# Patient Record
Sex: FEMALE | Race: WHITE | Employment: UNEMPLOYED | URBAN - METROPOLITAN AREA
[De-identification: names, ages, dates, MRNs, and addresses within clinical notes are randomized per-mention and may not be internally consistent; named-entity substitution may affect disease eponyms.]

---

## 2022-10-23 ENCOUNTER — APPOINTMENT (OUTPATIENT)
Dept: CT IMAGING | Age: 32
End: 2022-10-23
Attending: EMERGENCY MEDICINE
Payer: MEDICAID

## 2022-10-23 ENCOUNTER — HOSPITAL ENCOUNTER (EMERGENCY)
Age: 32
Discharge: HOME | End: 2022-10-24
Attending: EMERGENCY MEDICINE
Payer: MEDICAID

## 2022-10-23 DIAGNOSIS — N83.201 RIGHT OVARIAN CYST: Primary | ICD-10-CM

## 2022-10-23 DIAGNOSIS — R91.8 PULMONARY NODULES: ICD-10-CM

## 2022-10-23 DIAGNOSIS — R16.1 SPLENOMEGALY: ICD-10-CM

## 2022-10-23 DIAGNOSIS — R14.0 ABDOMINAL DISTENSION: ICD-10-CM

## 2022-10-23 LAB
ALBUMIN SERPL-MCNC: 4.2 G/DL (ref 3.5–5.2)
ALBUMIN/GLOB SERPL: 1.4 {RATIO} (ref 1.1–2.2)
ALP SERPL-CCNC: 34 U/L (ref 35–104)
ALT SERPL-CCNC: 30 U/L (ref 10–35)
ANION GAP SERPL CALC-SCNC: 13 MMOL/L (ref 5–15)
APPEARANCE UR: ABNORMAL
AST SERPL-CCNC: 37 U/L (ref 10–35)
BACTERIA URNS QL MICRO: ABNORMAL /HPF
BASOPHILS # BLD: 0 K/UL (ref 0–0.1)
BASOPHILS NFR BLD: 0 % (ref 0–1)
BILIRUB SERPL-MCNC: 0.4 MG/DL (ref 0.2–1)
BILIRUB UR QL: NEGATIVE
BUN SERPL-MCNC: 14 MG/DL (ref 6–20)
BUN/CREAT SERPL: 21 (ref 12–20)
CALCIUM SERPL-MCNC: 9 MG/DL (ref 8.6–10)
CHLORIDE SERPL-SCNC: 102 MMOL/L (ref 98–107)
CO2 SERPL-SCNC: 23 MMOL/L (ref 22–29)
COLOR UR: ABNORMAL
CREAT SERPL-MCNC: 0.67 MG/DL (ref 0.5–0.9)
DIFFERENTIAL METHOD BLD: ABNORMAL
EOSINOPHIL # BLD: 0.1 K/UL (ref 0–0.4)
EOSINOPHIL NFR BLD: 1 % (ref 0–7)
EPITH CASTS URNS QL MICRO: ABNORMAL /LPF
ERYTHROCYTE [DISTWIDTH] IN BLOOD BY AUTOMATED COUNT: 13.8 % (ref 11.5–14.5)
GLOBULIN SER CALC-MCNC: 2.9 G/DL (ref 2–4)
GLUCOSE SERPL-MCNC: 100 MG/DL (ref 65–100)
GLUCOSE UR STRIP.AUTO-MCNC: NEGATIVE MG/DL
HCG UR QL: NEGATIVE
HCT VFR BLD AUTO: 36.6 % (ref 35–47)
HGB BLD-MCNC: 12.1 G/DL (ref 11.5–16)
HGB UR QL STRIP: ABNORMAL
IMM GRANULOCYTES # BLD AUTO: 0 K/UL (ref 0–0.04)
IMM GRANULOCYTES NFR BLD AUTO: 0 % (ref 0–0.5)
KETONES UR QL STRIP.AUTO: NEGATIVE MG/DL
LEUKOCYTE ESTERASE UR QL STRIP.AUTO: NEGATIVE
LYMPHOCYTES # BLD: 1.2 K/UL (ref 0.8–3.5)
LYMPHOCYTES NFR BLD: 15 % (ref 12–49)
MCH RBC QN AUTO: 29.5 PG (ref 26–34)
MCHC RBC AUTO-ENTMCNC: 33.1 G/DL (ref 30–36.5)
MCV RBC AUTO: 89.3 FL (ref 80–99)
MONOCYTES # BLD: 1.2 K/UL (ref 0–1)
MONOCYTES NFR BLD: 13 % (ref 5–13)
NEUTS SEG # BLD: 6 K/UL (ref 1.8–8)
NEUTS SEG NFR BLD: 70 % (ref 32–75)
NITRITE UR QL STRIP.AUTO: NEGATIVE
NRBC # BLD: 0 K/UL (ref 0–0.01)
NRBC BLD-RTO: 0 PER 100 WBC
PH UR STRIP: 6 [PH] (ref 5–8)
PLATELET # BLD AUTO: 253 K/UL (ref 150–400)
PMV BLD AUTO: 10.2 FL (ref 8.9–12.9)
POTASSIUM SERPL-SCNC: 4 MMOL/L (ref 3.5–5.1)
PROT SERPL-MCNC: 7.1 G/DL (ref 6.4–8.3)
PROT UR STRIP-MCNC: NEGATIVE MG/DL
RBC # BLD AUTO: 4.1 M/UL (ref 3.8–5.2)
RBC #/AREA URNS HPF: ABNORMAL /HPF
SODIUM SERPL-SCNC: 138 MMOL/L (ref 136–145)
SP GR UR REFRACTOMETRY: 1.03 (ref 1–1.03)
UR CULT HOLD, URHOLD: NORMAL
UROBILINOGEN UR QL STRIP.AUTO: 0.2 EU/DL (ref 0.2–1)
WBC # BLD AUTO: 8.6 K/UL (ref 3.6–11)
WBC URNS QL MICRO: ABNORMAL /HPF (ref 0–4)

## 2022-10-23 PROCEDURE — 99285 EMERGENCY DEPT VISIT HI MDM: CPT

## 2022-10-23 PROCEDURE — 81001 URINALYSIS AUTO W/SCOPE: CPT

## 2022-10-23 PROCEDURE — 80053 COMPREHEN METABOLIC PANEL: CPT

## 2022-10-23 PROCEDURE — 96374 THER/PROPH/DIAG INJ IV PUSH: CPT

## 2022-10-23 PROCEDURE — 74011000636 HC RX REV CODE- 636: Performed by: EMERGENCY MEDICINE

## 2022-10-23 PROCEDURE — 74177 CT ABD & PELVIS W/CONTRAST: CPT

## 2022-10-23 PROCEDURE — 36415 COLL VENOUS BLD VENIPUNCTURE: CPT

## 2022-10-23 PROCEDURE — 85025 COMPLETE CBC W/AUTO DIFF WBC: CPT

## 2022-10-23 PROCEDURE — 74011250636 HC RX REV CODE- 250/636: Performed by: EMERGENCY MEDICINE

## 2022-10-23 RX ORDER — KETOROLAC TROMETHAMINE 30 MG/ML
15 INJECTION, SOLUTION INTRAMUSCULAR; INTRAVENOUS
Status: COMPLETED | OUTPATIENT
Start: 2022-10-23 | End: 2022-10-23

## 2022-10-23 RX ADMIN — SODIUM CHLORIDE 1000 ML: 9 INJECTION, SOLUTION INTRAVENOUS at 21:28

## 2022-10-23 RX ADMIN — IOPAMIDOL 100 ML: 755 INJECTION, SOLUTION INTRAVENOUS at 22:18

## 2022-10-23 RX ADMIN — KETOROLAC TROMETHAMINE 15 MG: 30 INJECTION, SOLUTION INTRAMUSCULAR at 21:28

## 2022-10-23 NOTE — Clinical Note
[address]  [facility] EMERGENCY DEPT  [address] [id number]  [phone]    Work/School Note    Date: 10/23/2022    To Whom It May concern:    Evan Farmer was seen and treated today in the emergency room by the following provider(s):  Attending Provider: Sammy Dejesus MD.      Evan Farmer is excused from work/school on 10/24/22 and 10/25/22. She is medically clear to return to work/school on 10/26/2022.        Sincerely,          Simba David MD

## 2022-10-24 ENCOUNTER — APPOINTMENT (OUTPATIENT)
Dept: ULTRASOUND IMAGING | Age: 32
End: 2022-10-24
Attending: EMERGENCY MEDICINE
Payer: MEDICAID

## 2022-10-24 VITALS
DIASTOLIC BLOOD PRESSURE: 87 MMHG | OXYGEN SATURATION: 99 % | BODY MASS INDEX: 24.99 KG/M2 | RESPIRATION RATE: 16 BRPM | HEIGHT: 65 IN | WEIGHT: 150 LBS | HEART RATE: 95 BPM | SYSTOLIC BLOOD PRESSURE: 99 MMHG | TEMPERATURE: 98.7 F

## 2022-10-24 PROCEDURE — 76830 TRANSVAGINAL US NON-OB: CPT

## 2022-10-24 PROCEDURE — 76856 US EXAM PELVIC COMPLETE: CPT

## 2022-10-24 RX ORDER — KETOROLAC TROMETHAMINE 10 MG/1
10 TABLET, FILM COATED ORAL
Qty: 20 TABLET | Refills: 0 | Status: SHIPPED | OUTPATIENT
Start: 2022-10-24 | End: 2022-10-29

## 2022-10-24 NOTE — ED NOTES
Emergency Department Nursing Plan of Care       The Nursing Plan of Care is developed from the Nursing assessment and Emergency Department Attending provider initial evaluation. The plan of care may be reviewed in the ED Provider note.     The Plan of Care was developed with the following considerations:   Patient / Family readiness to learn indicated by:verbalized understanding  Persons(s) to be included in education: patient  Barriers to Learning/Limitations:No    Signed     Joana Alejandra RN    10/23/2022   8:55 PM

## 2022-10-24 NOTE — ED PROVIDER NOTES
3year-old female presents from home accompanied by her boyfriend with a complaint of abdominal pain and distention. Symptoms started a few days ago with some generalized abdominal pain and discomfort. Over the past couple days his symptoms have worsened and she is developed bloating and distention of her lower abdomen. She denies any vomiting or fever. No change in her bowel movements. She has had some urinary symptoms off and on for the past couple of months but is not currently being treated for UTI or having dysuria. She has had a previous  but no other abdominal surgery. No other complaints at this time. Past Medical History:   Diagnosis Date    Rheumatoid arteritis ([facility] [address]        Past Surgical History:   Procedure Laterality Date    HX  SECTION      HX OTHER SURGICAL      LEAP         History reviewed. No pertinent family history. Social History     Socioeconomic History    Marital status:      Spouse name: Not on file    Number of children: Not on file    Years of education: Not on file    Highest education level: Not on file   Occupational History    Not on file   Tobacco Use    Smoking status: Never    Smokeless tobacco: Never   Substance and Sexual Activity    Alcohol use: Yes     Comment: socially/weekends    Drug use: Not Currently    Sexual activity: Not on file   Other Topics Concern    Not on file   Social History Narrative    Not on file     Social Determinants of Health     Financial Resource Strain: Not on file   Food Insecurity: Not on file   Transportation Needs: Not on file   Physical Activity: Not on file   Stress: Not on file   Social Connections: Not on file   Intimate Partner Violence: Not on file   Housing Stability: Not on file         ALLERGIES: Patient has no known allergies. Review of Systems   Constitutional:  Negative for fever. HENT:  Negative for facial swelling. Eyes:  Negative for visual disturbance.    Respiratory:  Negative for chest tightness. Cardiovascular:  Negative for chest pain. Gastrointestinal:  Negative for abdominal pain. Genitourinary:  Negative for difficulty urinating and dysuria. Musculoskeletal:  Negative for arthralgias. Skin:  Negative for rash. Neurological:  Negative for headaches. Hematological:  Negative for adenopathy. Psychiatric/Behavioral:  Negative for suicidal ideas. Vitals:    10/23/22 2312 10/23/22 2330 10/24/22 0000 10/24/22 0112   BP: 114/76 112/74 105/65 99/87   Pulse:       Resp:       Temp:       SpO2: 99% 99% 98% 99%   Weight:       Height:                Physical Exam  Vitals and nursing note reviewed. Constitutional:       General: She is not in acute distress. Appearance: She is well-developed. HENT:      Head: Normocephalic and atraumatic. Eyes:      General: No scleral icterus. Conjunctiva/sclera: Conjunctivae normal.      Pupils: Pupils are equal, round, and reactive to light. Cardiovascular:      Rate and Rhythm: Normal rate. Heart sounds: No murmur heard. Pulmonary:      Effort: Pulmonary effort is normal. No respiratory distress. Abdominal:      General: Bowel sounds are normal. There is distension. Palpations: Abdomen is soft. Tenderness: There is abdominal tenderness in the right lower quadrant and left lower quadrant. Musculoskeletal:         General: Normal range of motion. Cervical back: Normal range of motion and neck supple. Skin:     General: Skin is warm and dry. Findings: No rash. Neurological:      Mental Status: She is alert and oriented to person, place, and time. MDM  Number of Diagnoses or Management Options  Abdominal distension  Pulmonary nodules  Right ovarian cyst  Splenomegaly  Diagnosis management comments: Assessment: Patient here with abdominal pain and distention. Thinks is most likely secondary to a large ovarian cyst that was seen on CT scan.   Ultrasound was subsequently returned obtained to make sure that there was no torsion. This demonstrated normal blood flow to both ovaries and a 10 cm likely hemorrhagic ovarian cyst.  Patient is going to follow-up with her gynecologist for further treatment of the cyst.  She got a call today for an appointment. There was also mention of splenomegaly and pulmonary nodules on the CAT scan. I gave patient follow-up for hematology for further evaluation of these findings. She can return to the ER with any new or worsening symptoms.        Amount and/or Complexity of Data Reviewed  Clinical lab tests: reviewed  Tests in the radiology section of CPT®: reviewed           Procedures

## 2022-10-24 NOTE — ED TRIAGE NOTES
Pt.states she is having lower abdominal pain radiating around to back bilateral, fatigue and bloating. Pt.statse pain started few days ago and bloating today. Abdomen appears distended at triage. Pt.states she even took preg test at home which was neg. Pt.states she has had little to eat today but denies n/v with eating.  LBM yesterday and normal

## 2022-10-28 ENCOUNTER — OFFICE VISIT (OUTPATIENT)
Dept: OBGYN CLINIC | Age: 32
End: 2022-10-28
Payer: MEDICAID

## 2022-10-28 VITALS
DIASTOLIC BLOOD PRESSURE: 72 MMHG | SYSTOLIC BLOOD PRESSURE: 114 MMHG | WEIGHT: 156 LBS | HEIGHT: 65 IN | RESPIRATION RATE: 16 BRPM | HEART RATE: 90 BPM | OXYGEN SATURATION: 98 % | BODY MASS INDEX: 25.99 KG/M2

## 2022-10-28 DIAGNOSIS — N83.201 CYST OF RIGHT OVARY: Primary | ICD-10-CM

## 2022-10-28 PROCEDURE — 99202 OFFICE O/P NEW SF 15 MIN: CPT | Performed by: OBSTETRICS & GYNECOLOGY

## 2022-10-28 NOTE — PROGRESS NOTES
Latha Woody is a 29 y.o. female who presents today for the following:  Chief Complaint   Patient presents with    New Patient     Patient following up from Er for a R ovarian cyst.          HPI  81-year-old G2,  female who presented to the ER with right lower quadrant pain was found to have a right ovarian cyst of 10 cm in diameter. Patient states at that time several other things were found including an enlarged spleen, lung nodules and nodules in her spine spleen. She reports that the symptoms she had at the time of presentation have improved somewhat. She is currently awaiting follow-up with a hematologist for further evaluation. OB History          2    Para   1    Term   1            AB   1    Living   1         SAB   1    IAB        Ectopic        Molar        Multiple        Live Births   1                   /72 (BP 1 Location: Left upper arm, BP Patient Position: Sitting)   Pulse 90   Resp 16   Ht 5' 5\" (1.651 m)   Wt 156 lb (70.8 kg)   LMP 10/09/2022 (Approximate) Comment: Patient states she is on her cycle again today. SpO2 98%   BMI 25.96 kg/m²    OBGyn Exam   Patient is a well-developed well-nourished female no apparent distress  She is alert and oriented x3  Ultrasound reveals a 10 cm right ovarian cyst which is somewhat complex in nature consistent with a hemorrhagic cyst.  No results found for this visit on 10/28/22. Assessment:  Right 10 cm hemorrhagic cyst.  Discussed options for treatment including laparoscopy with right ovarian cystectomy however given other findings at the time of this ER visit we recommend delaying scheduling until after patient has been evaluated by hematology. Patient to be in touch with us and let us know following hematology evaluation. No orders of the defined types were placed in this encounter.

## 2022-11-10 ENCOUNTER — OFFICE VISIT (OUTPATIENT)
Dept: ONCOLOGY | Age: 32
End: 2022-11-10
Payer: MEDICAID

## 2022-11-10 VITALS
DIASTOLIC BLOOD PRESSURE: 75 MMHG | OXYGEN SATURATION: 97 % | HEART RATE: 92 BPM | BODY MASS INDEX: 24.69 KG/M2 | WEIGHT: 148.2 LBS | TEMPERATURE: 97.3 F | HEIGHT: 65 IN | SYSTOLIC BLOOD PRESSURE: 112 MMHG | RESPIRATION RATE: 16 BRPM

## 2022-11-10 DIAGNOSIS — D73.89 SPLENIC LESION: Primary | ICD-10-CM

## 2022-11-10 DIAGNOSIS — R53.83 OTHER FATIGUE: ICD-10-CM

## 2022-11-10 DIAGNOSIS — R91.1 PULMONARY NODULE: ICD-10-CM

## 2022-11-10 DIAGNOSIS — M05.79 RHEUMATOID ARTHRITIS INVOLVING MULTIPLE SITES WITH POSITIVE RHEUMATOID FACTOR (HCC): ICD-10-CM

## 2022-11-10 DIAGNOSIS — R63.4 WEIGHT LOSS: ICD-10-CM

## 2022-11-10 PROCEDURE — 99204 OFFICE O/P NEW MOD 45 MIN: CPT | Performed by: INTERNAL MEDICINE

## 2022-11-10 NOTE — PROGRESS NOTES
Rm    Chief Complaint   Patient presents with    New Patient     Enlarged spleen        Visit Vitals  /75 (BP 1 Location: Left upper arm, BP Patient Position: Sitting, BP Cuff Size: Adult)   Pulse 92   Temp 97.3 °F (36.3 °C) (Oral)   Resp 16   Ht 5' 5\" (1.651 m)   Wt 148 lb 3.2 oz (67.2 kg)   LMP 10/28/2022   SpO2 97%   BMI 24.66 kg/m²        1. Have you been to the ER, urgent care clinic since your last visit? Hospitalized since your last visit? 10/23/22 [address] [facility] ED    2. Have you seen or consulted any other health care providers outside of the [address] [place] [address] since your last visit? Include any pap smears or colon screening. No     Health Maintenance Due   Topic Date Due    Hepatitis C Screening  Never done    Depression Screen  Never done    COVID-19 Vaccine (1) Never done    DTaP/Tdap/Td series (1 - Tdap) Never done    Cervical cancer screen  Never done    Flu Vaccine (1) Never done        3 most recent PHQ Screens 11/10/2022   Little interest or pleasure in doing things Several days   Feeling down, depressed, irritable, or hopeless Several days   Total Score PHQ 2 2        No flowsheet data found. No flowsheet data found.

## 2022-11-10 NOTE — PROGRESS NOTES
[facility] at [address], [address]: [phone]  F: [phone] Patient ID  Name: Penny Schuster  YOB: 1990  MRN: [id number]  Referring Provider:   Unknown  No address on file  Primary Care Provider:   None       HEMATOLOGY/MEDICAL ONCOLOGY  NOTE   Date of Visit: 11/10/22   Reason for Evaluation:     Chief Complaint   Patient presents with    New Patient     Enlarged spleen       Subjective:     History of Present Illness:     Penny Schuster is a 29 y.o. F who presents for an initial evaluation for splenomegaly. This is a new problem. Problem has occurred for months. Problem has worsened. She reports that she has had night sweats nightly (2 weeks) Has had nausea/vomiting. Poor appetite for weeks. She reports that it has been about 2 weeks. She reports significant thirst.  Reports months of fatigue (since about 2022. Denies any travel of recent. She reports some weight loss of about 8 lbs. She denies any new medications other than toradol for three days. She denies any specific myalgias. She was having some pains in her hands. Reports sleeping 14-16 hours per day. -  Past Medical History:   Diagnosis Date    Lung nodule     Rheumatoid arteritis ([address]     Spleen enlarged       Past Surgical History:   Procedure Laterality Date    HX  SECTION      HX OTHER SURGICAL      LEAP      Social History     Tobacco Use    Smoking status: Never    Smokeless tobacco: Never   Substance Use Topics    Alcohol use: Yes     Comment: socially/weekends      Family History   Problem Relation Age of Onset    Lung Disease Mother     Arthritis-rheumatoid Mother     SKIN CANCER Mother     Heart Disease Father     Hypertension Father      No current outpatient medications on file. No current facility-administered medications for this visit.       No Known Allergies   -  Review of Systems provided by:patient  General: denies fever, reports appetite loss, reports fatigue, reports night sweats, and reports weight loss  Eyes: denies any acute vision loss and denies any eye pain  HEENT: denies epistaxis and denies trouble swallowing  Cardio: denies any chest pain and denies any leg swelling  Resp: denies any shortness of breath. denies any hemoptysis. reports dry cough. Abdomen: denies any diarrhea, denies any bloody stools, denies any melena, reports abdominal pain, reports nausea, reports vomiting, and reports abodminal pain has resovled. Reportedly felt like she was pregnant. She denies being pregnant. MSK: denies any myalgias, reports arthralgias, (has had pain in hands that resolved not necessarily to the toradol). Skin: denies any rash and denies any itching  Lymph: denies any lymph node tenderness and reports that her cervical lymph nodes feel \"swollen. \"  Neuro: denies any seizure history and reports a history of headaches  : Denies any dysuria. Reports a history of hematuria that has been intermittently. Has occurred a \"couple times\" in the past 5 months. Psych: denies anxiety, denies suicidal ideations, denies homicidal ideations, and reports depression. Objective:   Visit Vitals  /75 (BP 1 Location: Left upper arm, BP Patient Position: Sitting, BP Cuff Size: Adult)   Pulse 92   Temp 97.3 °F (36.3 °C) (Oral)   Resp 16   Ht 5' 5\" (1.651 m)   Wt 148 lb 3.2 oz (67.2 kg)   LMP 10/28/2022   SpO2 97%   BMI 24.66 kg/m²     ECOG PS: 1- Restricted in physically strenuous activity but ambulatory and able to carry out work of a light or sedentary nature, e.g., light house work, office work. Physical Exam  Constitutional: No acute distress. and Non-toxic appearance. HENT: Normocephalic and atraumatic head. Eyes: Normal Conjunctivae. Anicteric sclerae. Cardiovascular: S1,S2 auscultated. No pitting edema. Pulmonary: Normal Respiratory Effort. No wheezing. No rhonchi. No rales.    Abdominal: Normal bowel sounds. Soft Abdomen to palpation. Mild Left Upper Quadrant Abdominal tenderness. No guarding. No rebound tenderness. No hepatomegaly on palpation or percussion. No splenomegaly on palpation or percussion. Musculoskeletal: No muscle pain on palpation. No temporal muscle wasting on inspection. Skin: No jaundice. No rash. No petechiae. Neurological: Alert and oriented. No tremor on inspection. Normal Gait. Psychiatric: mood normal. normal speech rate. normal affect. Lymph: No cervical, supraclavicular,axillary, or inguinal lymph node enlargement or tenderness. Results:   I personally reviewed pertinent labs: I reviewed PatientFirst notes that document a positive Rheumatoid factor. I personally reviewed Epic EHR labs/results below:   Lab Results   Component Value Date/Time    WBC 8.6 10/23/2022 09:27 PM    HGB 12.1 10/23/2022 09:27 PM    HCT 36.6 10/23/2022 09:27 PM    PLATELET 440 72/48/2169 09:27 PM    MCV 89.3 10/23/2022 09:27 PM    ABS. NEUTROPHILS 6.0 10/23/2022 09:27 PM     Lab Results   Component Value Date/Time    Sodium 138 10/23/2022 09:27 PM    Potassium 4.0 10/23/2022 09:27 PM    Chloride 102 10/23/2022 09:27 PM    CO2 23 10/23/2022 09:27 PM    Glucose 100 10/23/2022 09:27 PM    BUN 14 10/23/2022 09:27 PM    Creatinine 0.67 10/23/2022 09:27 PM    Calcium 9.0 10/23/2022 09:27 PM     Lab Results   Component Value Date/Time    Bilirubin, total 0.4 10/23/2022 09:27 PM    ALT (SGPT) 30 10/23/2022 09:27 PM    Alk. phosphatase 34 (L) 10/23/2022 09:27 PM    Protein, total 7.1 10/23/2022 09:27 PM    Albumin 4.2 10/23/2022 09:27 PM    Globulin 2.9 10/23/2022 09:27 PM     CT ABD PELV W CONT    Result Date: 10/23/2022  EXAM: CT ABD PELV W CONT INDICATION: lower abd pain, distention and bloating COMPARISON: 0 CONTRAST: 100 mL of Isovue-370. ORAL CONTRAST: 0 TECHNIQUE: Following the uneventful intravenous administration of contrast, thin axial images were obtained through the abdomen and pelvis. Coronal and sagittal reconstructions were generated. CT dose reduction was achieved through use of a standardized protocol tailored for this examination and automatic exposure control for dose modulation. FINDINGS: LOWER THORAX: Ill-defined 1-3 mm nodules in the right middle lobe and right lower lobe LIVER: No mass. BILIARY TREE: Gallbladder is within normal limits. CBD is not dilated. SPLEEN: Innumerable hypodensities within an enlarged spleen PANCREAS: No mass or ductal dilatation. ADRENALS: Unremarkable. KIDNEYS: No mass, calculus. Small hypodensities within the kidneys. 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 STOMACH: Unremarkable. SMALL BOWEL: No dilatation or wall thickening. COLON: No dilatation or wall thickening. APPENDIX: Normal in front of the cecum PERITONEUM: No ascites or pneumoperitoneum. RETROPERITONEUM: No lymphadenopathy or aortic aneurysm. REPRODUCTIVE ORGANS: Normal uterus. Anterior to the uterus is adnexal 7.9 cm structure with density measurement of 21 HU URINARY BLADDER: No mass or calculus. BONES: No destructive bone lesion. ABDOMINAL WALL: No mass or hernia. ADDITIONAL COMMENTS: N/A     Abnormal enlarged spleen with multiple hypodensities. Please correlate with laboratory values. 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 Multiple bilateral renal cysts. Adnexal 8 cm structure with density measurement of 22. Pelvic ultrasound recommended to differentiate between ovarian cyst and alternate pelvic mass    US TRANSVAGINAL    Result Date: 10/24/2022  EXAM:  ULTRASOUND FEMALE PELVIS TRANSABDOMINAL AND TRANSVAGINAL INDICATION: Adnexal mass. COMPARISON: CT from earlier. TRANSABDOMINAL TECHNIQUE: Real-time sonography of the pelvis was performed using the urine filled bladder as an acoustic window. Multiple static images of the uterus and ovaries were obtained. FINDINGS: UTERUS: The uterus is normal in size and echotexture and measures 10.4 x 4.3 x 6.1 cm.  ENDOMETRIUM: The endometrial stripe measures 16 mm. RIGHT OVARY: The right ovary measures 8.3 x 3.1 x 11 cm and contains a 7.6 x 7.5 x 10.6 cm complex cyst. There is flow in the right ovary. LEFT OVARY: The left ovary measures 4.1 x 2.0 x 2.5 cm and has normal flow. CUL-DE-SAC: There is no mass or fluid or other abnormality in the adnexa or cul-de-sac. Normal pelvic ultrasound examination. Transvaginal sonography will be performed to better evaluate. TRANSVAGINAL TECHNIQUE: Transvaginal sonography was performed with multiple static images of the uterus and ovaries obtained. FINDINGS: UTERUS: The uterus is normal. The uterus measures 8.4 x 4.4 x 6.2 cm. . ENDOMETRIUM: The endometrial stripe measures 12 mm. RIGHT OVARY: The right ovary is normal. The right ovary measures 8.1 x 10.6 x 9.2 and contains a complex 6.9 x 10.2 x 8.0 cm cyst.  There is normal color flow to the right ovary. LEFT OVARY: The left ovary is normal. The left ovary measures 4.4 x 2.6 x 2 cm. There is normal color flow to the left ovary. CUL-DE-SAC: There is no fluid or mass or other abnormality in the pelvic cul-de-sac. IMPRESSION: Complex right ovarian 10 cm cyst, presumably a hemorrhagic cyst No evidence for ovarian torsion    US PELV NON OBS    Result Date: 10/24/2022  EXAM:  ULTRASOUND FEMALE PELVIS TRANSABDOMINAL AND TRANSVAGINAL INDICATION: Adnexal mass. COMPARISON: CT from earlier. TRANSABDOMINAL TECHNIQUE: Real-time sonography of the pelvis was performed using the urine filled bladder as an acoustic window. Multiple static images of the uterus and ovaries were obtained. FINDINGS: UTERUS: The uterus is normal in size and echotexture and measures 10.4 x 4.3 x 6.1 cm. ENDOMETRIUM: The endometrial stripe measures 16 mm. RIGHT OVARY: The right ovary measures 8.3 x 3.1 x 11 cm and contains a 7.6 x 7.5 x 10.6 cm complex cyst. There is flow in the right ovary. LEFT OVARY: The left ovary measures 4.1 x 2.0 x 2.5 cm and has normal flow.  CUL-DE-SAC: There is no mass or fluid or other abnormality in the adnexa or cul-de-sac. Normal pelvic ultrasound examination. Transvaginal sonography will be performed to better evaluate. TRANSVAGINAL TECHNIQUE: Transvaginal sonography was performed with multiple static images of the uterus and ovaries obtained. FINDINGS: UTERUS: The uterus is normal. The uterus measures 8.4 x 4.4 x 6.2 cm. . ENDOMETRIUM: The endometrial stripe measures 12 mm. RIGHT OVARY: The right ovary is normal. The right ovary measures 8.1 x 10.6 x 9.2 and contains a complex 6.9 x 10.2 x 8.0 cm cyst.  There is normal color flow to the right ovary. LEFT OVARY: The left ovary is normal. The left ovary measures 4.4 x 2.6 x 2 cm. There is normal color flow to the left ovary. CUL-DE-SAC: There is no fluid or mass or other abnormality in the pelvic cul-de-sac. IMPRESSION: Complex right ovarian 10 cm cyst, presumably a hemorrhagic cyst No evidence for ovarian torsion       Assessment and Recommendations:     1. Splenic lesion  -Discussed with patient that per dimensions I do not know that she has any splenomegaly within with length height. We reviewed her PACS images together. I discussed with her that we will need to talk to radiology to determine if there is a better imaging modality to further characterize the mention splenic lesions. We discussed that I am not sure that a PET CT scan is immediately indicated upfront. However, she does have pulmonary nodules.  -Patient wonders if she may have sarcoidosis as this was mentioned to her. We discussed that we are referring her to pulmonology. - CT CHEST W CONT; Future  - CBC WITH AUTOMATED DIFF; Future  - METABOLIC PANEL, COMPREHENSIVE; Future  - FERRITIN; Future  - IRON PROFILE; Future  - FOLATE; Future  - VITAMIN B12; Future  - SPEP AND YU, SERUM; Future  - RETICULOCYTE COUNT; Future  - LD; Future  - SED RATE (ESR); Future  - CRP, HIGH SENSITIVITY; Future  - HIV 1/2 AG/AB, 4TH GENERATION,W RFLX CONFIRM;  Future  - HEP B SURFACE AB; Future  - HEP B SURFACE AG; Future  - HEP B CORE AB, TOT; Future  - HEPATITIS C AB; Future  - HEP B CORE AB, TOT    2. Pulmonary nodule  -Patient with unknown pulmonary nodule that could be related to possible rheumatoid arthritis. -It sounds as if patient may have some familial interstitial lung disease which is noted on her maternal side of the family. Again, referral placed to pulmonology.    - REFERRAL TO PULMONARY DISEASE    3. Weight loss  -Have ordered CT chest imaging since she has not had completed yet. I cannot exclude some occult lymphoma. Have ordered LDH, hepatitis B, C, HIV testing.  -We discussed that we will typically biopsy splenic lesions due to bleeding risk.    - CT CHEST W CONT; Future  - CBC WITH AUTOMATED DIFF; Future  - METABOLIC PANEL, COMPREHENSIVE; Future  - FERRITIN; Future  - IRON PROFILE; Future  - FOLATE; Future  - VITAMIN B12; Future  - SPEP AND YU, SERUM; Future  - RETICULOCYTE COUNT; Future  - LD; Future  - SED RATE (ESR); Future  - CRP, HIGH SENSITIVITY; Future  - HIV 1/2 AG/AB, 4TH GENERATION,W RFLX CONFIRM; Future  - HEP B SURFACE AB; Future  - HEP B SURFACE AG; Future  - HEP B CORE AB, TOT; Future  - HEPATITIS C AB; Future  - HEP B CORE AB, TOT    4. Rheumatoid arthritis involving multiple sites with positive rheumatoid factor (HCC)  -We will place referral to rheumatology. Due to constraints may need to contact rheumatology to accelerate visit referral. She has had mainly bilateral hand pain and feels that she has some skeletal abnormalities in her finger bones. I did not observe any obvious swan neck abnormalities. - REFERRAL TO RHEUMATOLOGY    5. Other fatigue  -Unclear if this is due to autoimmune disease, occult malignancy.  -Patient has had some difficulty with oral intake. She is taking ensure.   I discussed with her that we can refer her to gastroenterology but for now she would like to hold off and pursue the other work-up first. Cannot exclude that she may need upper and lower endoscopies.  -Without cytopenias, I'm not sure that is indicated upfront. She will need a peripheral smear added if possible to her orders.    - CT CHEST W CONT; Future  - CBC WITH AUTOMATED DIFF; Future  - METABOLIC PANEL, COMPREHENSIVE; Future  - FERRITIN; Future  - IRON PROFILE; Future  - FOLATE; Future  - VITAMIN B12; Future  - SPEP AND YU, SERUM; Future  - RETICULOCYTE COUNT; Future  - LD; Future  - SED RATE (ESR); Future  - CRP, HIGH SENSITIVITY; Future  - HIV 1/2 AG/AB, 4TH GENERATION,W RFLX CONFIRM; Future  - HEP B SURFACE AB; Future  - HEP B SURFACE AG; Future  - HEP B CORE AB, TOT; Future  - HEPATITIS C AB; Future  - HEP B CORE AB, TOT      Follow-up and Dispositions    Return in about 1 week (around 11/17/2022).            Toribio Nash MD  Hematology/Medical Oncology Provider  [facility]  P: [phone]    Signed By:   Leif Way MD

## 2022-11-11 PROBLEM — R53.83 OTHER FATIGUE: Status: ACTIVE | Noted: 2022-11-11

## 2022-11-11 PROBLEM — M05.79 RHEUMATOID ARTHRITIS INVOLVING MULTIPLE SITES WITH POSITIVE RHEUMATOID FACTOR (HCC): Status: ACTIVE | Noted: 2022-11-11

## 2022-11-11 PROBLEM — R63.4 WEIGHT LOSS: Status: ACTIVE | Noted: 2022-11-11

## 2022-11-11 LAB — HBV CORE AB SERPL QL IA: NEGATIVE

## 2022-11-14 ENCOUNTER — TELEPHONE (OUTPATIENT)
Dept: ONCOLOGY | Age: 32
End: 2022-11-14

## 2022-11-14 NOTE — TELEPHONE ENCOUNTER
Patient called and stated she has an appointment on Wednesday but her whole house has RSV. She stated she would like to know if this can be discussed over the phone or if she needs to have an appointment. Please advise and call patient back.     # [phone]

## 2022-11-16 ENCOUNTER — VIRTUAL VISIT (OUTPATIENT)
Dept: ONCOLOGY | Age: 32
End: 2022-11-16
Payer: MEDICAID

## 2022-11-16 DIAGNOSIS — R53.83 OTHER FATIGUE: Primary | ICD-10-CM

## 2022-11-16 PROCEDURE — 99214 OFFICE O/P EST MOD 30 MIN: CPT | Performed by: INTERNAL MEDICINE

## 2022-11-16 RX ORDER — BENZONATATE 100 MG/1
CAPSULE ORAL
COMMUNITY
Start: 2022-11-15

## 2022-11-16 RX ORDER — AMOXICILLIN AND CLAVULANATE POTASSIUM 875; 125 MG/1; MG/1
TABLET, FILM COATED ORAL
COMMUNITY
Start: 2022-11-15

## 2022-11-16 NOTE — PROGRESS NOTES
[facility] at [address], [address] [facility] 100[address] [facility] Bottcher: [phone]  F: [phone] Patient ID  Name: Kareem Ha  YOB: 1990  MRN: [id number]  Referring Provider:   No referring provider defined for this encounter. Primary Care Provider:   None       HEMATOLOGY/MEDICAL ONCOLOGY  NOTE   Date of Visit: 22   Reason for Evaluation:     Chief Complaint   Patient presents with    Follow-up     Follow-up for splenomegaly. Patient reports pain at area where spleen is at 3/10. Subjective:     History of Present Illness:     Kareem Ha is a 29 y.o. F who presents for a follow-up evaluation for weight loss,poor appetite,fatigue,splenic lesions. Fatigue improving. She overall reports feeling better. She feels some tenderness in her left abdomen. .   Patient overall reports feeling improved. She will see Pulmonology this Friday. She is set for a Chest CT on 22. She denies any fevers. Still wants to wait on a GI consultation. Awaiting Rheumatology appointment.  Notes she saw Gynecology and they are observing right ovarian cyst.           -  Past Medical History:   Diagnosis Date    Lung nodule     Rheumatoid arteritis ([address]     Spleen enlarged       Past Surgical History:   Procedure Laterality Date    HX  SECTION      HX OTHER SURGICAL      LEAP      Social History     Tobacco Use    Smoking status: Never    Smokeless tobacco: Never   Substance Use Topics    Alcohol use: Not Currently     Comment: socially/weekends      Family History   Problem Relation Age of Onset    Lung Disease Mother         interstitial lung disease - ? crystallization of her lugns    Arthritis-rheumatoid Mother     SKIN CANCER Mother     Heart Disease Father     Hypertension Father     Pulmonary Fibrosis Maternal Grandmother      Current Outpatient Medications   Medication Sig    amoxicillin-clavulanate (AUGMENTIN) 875-125 mg per tablet benzonatate (TESSALON) 100 mg capsule      No current facility-administered medications for this visit. No Known Allergies   -  Review of Systems Provided by:  Patient  Review of Systems: A complete review of systems was obtained, reviewed. Pertinent findings reviewed above. Objective:   Visit Vitals  LMP 10/28/2022     ECOG PS: 1- Restricted in physically strenuous activity but ambulatory and able to carry out work of a light or sedentary nature, e.g., light house work, office work. Physical Exam  Constitutional: No acute distress. and Non-toxic appearance. HENT: Normocephalic and atraumatic head. Eyes: Normal Conjunctivae. Anicteric sclerae. Pulmonary: Normal Respiratory Effort. Skin: No jaundice. No rash. Musculoskeletal: No temporal muscle wasting on gross inspection. Neurological: Alert and oriented. No tremor on inspection. Psychiatric: mood normal. normal speech rate. normal affect. Due to this being a TeleHealth evaluation (During [facility]-29 public health emergency), many elements of the physical examination are unable to be assessed. Evaluation of the following organ systems was limited: Vitals/Constitutional/EENT/Resp/CV/GI//MS/Neuro/Skin/Heme-Lymph-Imm. Results:   I personally reviewed pertinent labs: I reviewed Patient[employer] notes that document a positive Rheumatoid factor. I personally reviewed Epic EHR labs/results below:   Lab Results   Component Value Date/Time    WBC 10.2 11/10/2022 01:14 PM    HGB 11.8 11/10/2022 01:14 PM    HCT 38.7 11/10/2022 01:14 PM    PLATELET 631 (H) 34/27/7843 01:14 PM    MCV 91.7 11/10/2022 01:14 PM    ABS.  NEUTROPHILS 8.1 (H) 11/10/2022 01:14 PM     Lab Results   Component Value Date/Time    Sodium 137 11/10/2022 01:14 PM    Potassium 4.7 11/10/2022 01:14 PM    Chloride 102 11/10/2022 01:14 PM    CO2 29 11/10/2022 01:14 PM    Glucose 90 11/10/2022 01:14 PM    BUN 7 11/10/2022 01:14 PM    Creatinine 0.85 11/10/2022 01:14 PM    Calcium 9.2 11/10/2022 01:14 PM     Lab Results   Component Value Date/Time    Bilirubin, total 0.3 11/10/2022 01:14 PM    ALT (SGPT) 22 11/10/2022 01:14 PM    Alk. phosphatase 43 (L) 11/10/2022 01:14 PM    Protein, total 7.2 11/10/2022 01:14 PM    Protein, total 7.6 11/10/2022 01:14 PM    Albumin 3.5 11/10/2022 01:14 PM    Globulin 4.1 (H) 11/10/2022 01:14 PM     CT ABD PELV W CONT    Result Date: 10/23/2022  EXAM: CT ABD PELV W CONT INDICATION: lower abd pain, distention and bloating COMPARISON: 0 CONTRAST: 100 mL of Isovue-370. ORAL CONTRAST: 0 TECHNIQUE: Following the uneventful intravenous administration of contrast, thin axial images were obtained through the abdomen and pelvis. Coronal and sagittal reconstructions were generated. CT dose reduction was achieved through use of a standardized protocol tailored for this examination and automatic exposure control for dose modulation. FINDINGS: LOWER THORAX: Ill-defined 1-3 mm nodules in the right middle lobe and right lower lobe LIVER: No mass. BILIARY TREE: Gallbladder is within normal limits. CBD is not dilated. SPLEEN: Innumerable hypodensities within an enlarged spleen PANCREAS: No mass or ductal dilatation. ADRENALS: Unremarkable. KIDNEYS: No mass, calculus. Small hypodensities within the kidneys. 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 STOMACH: Unremarkable. SMALL BOWEL: No dilatation or wall thickening. COLON: No dilatation or wall thickening. APPENDIX: Normal in front of the cecum PERITONEUM: No ascites or pneumoperitoneum. RETROPERITONEUM: No lymphadenopathy or aortic aneurysm. REPRODUCTIVE ORGANS: Normal uterus. Anterior to the uterus is adnexal 7.9 cm structure with density measurement of 21 HU URINARY BLADDER: No mass or calculus. BONES: No destructive bone lesion. ABDOMINAL WALL: No mass or hernia. ADDITIONAL COMMENTS: N/A     Abnormal enlarged spleen with multiple hypodensities. Please correlate with laboratory values.  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 Multiple bilateral renal cysts. Adnexal 8 cm structure with density measurement of 22. Pelvic ultrasound recommended to differentiate between ovarian cyst and alternate pelvic mass    US TRANSVAGINAL    Result Date: 10/24/2022  EXAM:  ULTRASOUND FEMALE PELVIS TRANSABDOMINAL AND TRANSVAGINAL INDICATION: Adnexal mass. COMPARISON: CT from earlier. TRANSABDOMINAL TECHNIQUE: Real-time sonography of the pelvis was performed using the urine filled bladder as an acoustic window. Multiple static images of the uterus and ovaries were obtained. FINDINGS: UTERUS: The uterus is normal in size and echotexture and measures 10.4 x 4.3 x 6.1 cm. ENDOMETRIUM: The endometrial stripe measures 16 mm. RIGHT OVARY: The right ovary measures 8.3 x 3.1 x 11 cm and contains a 7.6 x 7.5 x 10.6 cm complex cyst. There is flow in the right ovary. LEFT OVARY: The left ovary measures 4.1 x 2.0 x 2.5 cm and has normal flow. CUL-DE-SAC: There is no mass or fluid or other abnormality in the adnexa or cul-de-sac. Normal pelvic ultrasound examination. Transvaginal sonography will be performed to better evaluate. TRANSVAGINAL TECHNIQUE: Transvaginal sonography was performed with multiple static images of the uterus and ovaries obtained. FINDINGS: UTERUS: The uterus is normal. The uterus measures 8.4 x 4.4 x 6.2 cm. . ENDOMETRIUM: The endometrial stripe measures 12 mm. RIGHT OVARY: The right ovary is normal. The right ovary measures 8.1 x 10.6 x 9.2 and contains a complex 6.9 x 10.2 x 8.0 cm cyst.  There is normal color flow to the right ovary. LEFT OVARY: The left ovary is normal. The left ovary measures 4.4 x 2.6 x 2 cm. There is normal color flow to the left ovary. CUL-DE-SAC: There is no fluid or mass or other abnormality in the pelvic cul-de-sac.  IMPRESSION: Complex right ovarian 10 cm cyst, presumably a hemorrhagic cyst No evidence for ovarian torsion    US PELV NON OBS    Result Date: 10/24/2022  EXAM:  ULTRASOUND FEMALE PELVIS TRANSABDOMINAL AND TRANSVAGINAL INDICATION: Adnexal mass. COMPARISON: CT from earlier. TRANSABDOMINAL TECHNIQUE: Real-time sonography of the pelvis was performed using the urine filled bladder as an acoustic window. Multiple static images of the uterus and ovaries were obtained. FINDINGS: UTERUS: The uterus is normal in size and echotexture and measures 10.4 x 4.3 x 6.1 cm. ENDOMETRIUM: The endometrial stripe measures 16 mm. RIGHT OVARY: The right ovary measures 8.3 x 3.1 x 11 cm and contains a 7.6 x 7.5 x 10.6 cm complex cyst. There is flow in the right ovary. LEFT OVARY: The left ovary measures 4.1 x 2.0 x 2.5 cm and has normal flow. CUL-DE-SAC: There is no mass or fluid or other abnormality in the adnexa or cul-de-sac. Normal pelvic ultrasound examination. Transvaginal sonography will be performed to better evaluate. TRANSVAGINAL TECHNIQUE: Transvaginal sonography was performed with multiple static images of the uterus and ovaries obtained. FINDINGS: UTERUS: The uterus is normal. The uterus measures 8.4 x 4.4 x 6.2 cm. . ENDOMETRIUM: The endometrial stripe measures 12 mm. RIGHT OVARY: The right ovary is normal. The right ovary measures 8.1 x 10.6 x 9.2 and contains a complex 6.9 x 10.2 x 8.0 cm cyst.  There is normal color flow to the right ovary. LEFT OVARY: The left ovary is normal. The left ovary measures 4.4 x 2.6 x 2 cm. There is normal color flow to the left ovary. CUL-DE-SAC: There is no fluid or mass or other abnormality in the pelvic cul-de-sac. IMPRESSION: Complex right ovarian 10 cm cyst, presumably a hemorrhagic cyst No evidence for ovarian torsion       Assessment and Recommendations:     1. Splenic lesion  -Discussed case with Dr. Kody Mcneil about any better imaging indicated for splenic lesions. He recommended observing at current time and agreed no massive splenomegaly.  Unclear etiology but suspect benign lesions.  -consideration of follow-up splenic imaging in about 3 months. 2. Pulmonary nodule  -will see pulmonology later this week. -CT scan set for next week. 3. Weight loss  -Declines GI referral again at this time despite my recommendation. She wants to hold off for now. 4. Rheumatoid arthritis involving multiple sites with positive rheumatoid factor ([facility] [address]  -Corresponded with Dr. Tien Uriostegui this past week; appreciate his help in seeing patient. 5. Other fatigue  -reports fatigue improving. Have ordered smear to make sure no signs of peripheral blasts. - CBC WITH AUTOMATED DIFF; Future  - PERIPHERAL SMEAR; Future    Follow-up and Dispositions    Return in about 29 days (around 12/15/2022) for MD VISIT. Tara Love MD  Hematology/Medical Oncology Provider  [place] [address]  P: [phone]    Signed By:   Mireille Haywood MD       The patient was evaluated through a synchronous (real-time) audio-video encounter. The patient (or guardian if applicable) is aware that this is a billable service. Verbal consent to proceed has been obtained within the past 12 months. The visit was conducted pursuant to the emergency declaration under the [address] [facility], [address] [place] [address] authority and the [employer] and [employer] General Act. Patient identification was verified, and a caregiver  was present when appropriate. The patient was located in a state where the provider was credentialed to provide care.

## 2022-11-16 NOTE — PROGRESS NOTES
1. Have you been to the ER, urgent care clinic since your last visit? Hospitalized since your last visit? No    2. Have you seen or consulted any other health care providers outside of the [address] [place] [address] since your last visit? Include any pap smears or colon screening. Yes PCP, Patient [facility]        There were no vitals taken for this visit. Chief Complaint   Patient presents with    Follow-up     Follow-up for splenomegaly. Patient reports pain at area where spleen is at 3/10.

## 2022-11-16 NOTE — Clinical Note
Hi, please fax her CBC to [facility] and let her know to get the smear/cbc labs drawn. Jacob Hall, please schedule follow-up and let patient know.

## 2022-11-17 ENCOUNTER — TELEPHONE (OUTPATIENT)
Dept: ONCOLOGY | Age: 32
End: 2022-11-17

## 2022-11-17 NOTE — TELEPHONE ENCOUNTER
[address] at [facility]  [phone]    11/17/22 1:40 PM - Called and spoke with the patient. Patient's ID verified x 2. Inquired which [facility] she plans on using for the labs ordered by Dr. Karthikeyan Mahoney. The patient would like to go to the one in [place]. Advised this nurse will fax her labs orders to that [address] [place] [address] Avenue now. Advised patient to please have those done as soon as possible. The patient voiced understanding and gratitude for the call. No further questions or concerns.

## 2022-11-21 ENCOUNTER — HOSPITAL ENCOUNTER (OUTPATIENT)
Dept: CT IMAGING | Age: 32
Discharge: HOME | End: 2022-11-21
Attending: INTERNAL MEDICINE
Payer: MEDICAID

## 2022-11-21 DIAGNOSIS — R63.4 WEIGHT LOSS: ICD-10-CM

## 2022-11-21 DIAGNOSIS — R53.83 OTHER FATIGUE: ICD-10-CM

## 2022-11-21 DIAGNOSIS — D73.89 SPLENIC LESION: ICD-10-CM

## 2022-11-21 PROCEDURE — 74011000636 HC RX REV CODE- 636: Performed by: INTERNAL MEDICINE

## 2022-11-21 PROCEDURE — 71260 CT THORAX DX C+: CPT

## 2022-11-21 RX ADMIN — IOPAMIDOL 100 ML: 612 INJECTION, SOLUTION INTRAVENOUS at 10:55

## 2022-11-29 LAB
BASOPHILS # BLD AUTO: 0 X10E3/UL (ref 0–0.2)
BASOPHILS NFR BLD AUTO: 0 %
EOSINOPHIL # BLD AUTO: 0.1 X10E3/UL (ref 0–0.4)
EOSINOPHIL NFR BLD AUTO: 2 %
ERYTHROCYTE [DISTWIDTH] IN BLOOD BY AUTOMATED COUNT: 13.9 % (ref 11.7–15.4)
HCT VFR BLD AUTO: 33.9 % (ref 34–46.6)
HGB BLD-MCNC: 10.8 G/DL (ref 11.1–15.9)
IMM GRANULOCYTES # BLD AUTO: 0 X10E3/UL (ref 0–0.1)
IMM GRANULOCYTES NFR BLD AUTO: 0 %
LYMPHOCYTES # BLD AUTO: 1 X10E3/UL (ref 0.7–3.1)
LYMPHOCYTES NFR BLD AUTO: 16 %
MCH RBC QN AUTO: 27.7 PG (ref 26.6–33)
MCHC RBC AUTO-ENTMCNC: 31.9 G/DL (ref 31.5–35.7)
MCV RBC AUTO: 87 FL (ref 79–97)
MONOCYTES # BLD AUTO: 0.8 X10E3/UL (ref 0.1–0.9)
MONOCYTES NFR BLD AUTO: 11 %
NEUTROPHILS # BLD AUTO: 4.7 X10E3/UL (ref 1.4–7)
NEUTROPHILS NFR BLD AUTO: 71 %
PATH INTERP BLD-IMP: ABNORMAL
PATH REV BLD -IMP: ABNORMAL
PATHOLOGIST NAME: ABNORMAL
PLATELET # BLD AUTO: 373 X10E3/UL (ref 150–450)
RBC # BLD AUTO: 3.9 X10E6/UL (ref 3.77–5.28)
WBC # BLD AUTO: 6.7 X10E3/UL (ref 3.4–10.8)

## 2022-12-01 ENCOUNTER — PATIENT MESSAGE (OUTPATIENT)
Dept: ONCOLOGY | Age: 32
End: 2022-12-01

## 2022-12-01 NOTE — PROGRESS NOTES
Please follow-up with Pulmonary and Rheumatology regarding the nodules. Fortunately no lymph node enlargement.   Thanks,  Dr. Emeka Parsons

## 2022-12-08 ENCOUNTER — OFFICE VISIT (OUTPATIENT)
Dept: RHEUMATOLOGY | Age: 32
End: 2022-12-08
Payer: MEDICAID

## 2022-12-08 VITALS
OXYGEN SATURATION: 98 % | RESPIRATION RATE: 16 BRPM | DIASTOLIC BLOOD PRESSURE: 83 MMHG | BODY MASS INDEX: 24.46 KG/M2 | TEMPERATURE: 98 F | WEIGHT: 147 LBS | SYSTOLIC BLOOD PRESSURE: 124 MMHG | HEART RATE: 67 BPM

## 2022-12-08 DIAGNOSIS — D73.89 SPLENIC LESION: ICD-10-CM

## 2022-12-08 DIAGNOSIS — Z79.899 ONGOING USE OF POSSIBLY TOXIC MEDICATION: ICD-10-CM

## 2022-12-08 DIAGNOSIS — M05.9 SEROPOSITIVE RHEUMATOID ARTHRITIS (HCC): Primary | ICD-10-CM

## 2022-12-08 DIAGNOSIS — E83.52 HYPERCALCEMIA: ICD-10-CM

## 2022-12-08 DIAGNOSIS — R91.1 PULMONARY NODULE: ICD-10-CM

## 2022-12-08 PROCEDURE — 99205 OFFICE O/P NEW HI 60 MIN: CPT | Performed by: INTERNAL MEDICINE

## 2022-12-08 RX ORDER — FOLIC ACID 1 MG/1
1 TABLET ORAL DAILY
Qty: 90 TABLET | Refills: 5 | Status: SHIPPED | OUTPATIENT
Start: 2022-12-08

## 2022-12-08 RX ORDER — METHOTREXATE 2.5 MG/1
15 TABLET ORAL
Qty: 72 TABLET | Refills: 0 | Status: SHIPPED | OUTPATIENT
Start: 2022-12-10

## 2022-12-08 NOTE — PROGRESS NOTES
Chief Complaint   Patient presents with    Joint Pain     1. Have you been to the ER, urgent care clinic since your last visit? Hospitalized since your last visit? ER Oct 23     2. Have you seen or consulted any other health care providers outside of the [address] [place] [address] since your last visit? Include any pap smears or colon screening.

## 2022-12-08 NOTE — PROGRESS NOTES
REASON FOR VISIT:   Angela Cordero is a 29 y.o. female with history of iron deficiency anemia who is being referred to [address] [place] [address] Rheumatology at the request of Dr. Ray Johnson, regarding a diagnosis of RF+ arthralgia. HISTORY OF PRESENT ILLNESS    Ms Sindy Singh is a new pt. Pt first started to develop fatigue in June. Then around September she started to develop pain in her hands, fingers, and toes. She notes that she has a nodule on one of her fingers. She denies pain in her larger joints. She says that she no longer has hand pain, but her fatigue is still bad. She had blood work done by her PCP, which demonstrated a low-titer RF, prompting her referral to Rheumatology for possible rheumatoid arthritis. Pt takes 800mg off Ibuprofen occasionally when she has breakthrough pain. Pt was in the ER recently because she had pain and tenderness around her abdomen. She notes that she had vomiting and decreased appetite for a couple of weeks after her ER visit. She says that she slept for almost a week straight when she was in the hospital. CT of the abdomen had demonstrated an enlarged spleen with multiple hypodensities, a complex right ovarian cyst followed up with [facility]. She was then referred to Dr. Ray Johnson in Hematology who saw her elevated immunoglobulins and facilitated this evaluation. She had also seen Dr. Chiquis Kline with [facility] for incidentally noted basilar lung nodules on abdominal CT, and followup chest CT has demonstrated multiple bilateral pulmonary nodules without hilar lymphadenopathy for which she also has upcoming Pulmonary evaluation. Pt had RSV a couple of weeks ago. She did not have a cough before this. She notes that her mother had RA and interstitial fibrosis. She denies travelling to the [facility]. She has a dog at home but no birds. Pt says that her fingers get red and a little bit numb in the cold. She has never taken medication for this.      Pt denies dry mouth, dry eyes, history of hyper mobility, outdoor hobbies. Pt has not been going to the gym since she started to develop fatigue. 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 She has a 8year old and a 3year old. She has lived in her house in [place] for 5 years. REVIEW OF SYSTEMS  A comprehensive review of systems was negative except for that written in the HPI. A 10-point review of systems is per the new patient questionnaire, which has been reviewed extensively and scanned into the electronic medical record for future reference. Review of systems is as above and is otherwise negative. ALLERGIES  Patient has no known allergies. MEDICATIONS  Current Outpatient Medications   Medication Sig    amoxicillin-clavulanate (AUGMENTIN) 875-125 mg per tablet     benzonatate (TESSALON) 100 mg capsule      No current facility-administered medications for this visit. PAST MEDICAL HISTORY  Past Medical History:   Diagnosis Date    Lung nodule     Rheumatoid arteritis ([facility] [address]     Spleen enlarged        FAMILY HISTORY  family history includes Arthritis-rheumatoid in her mother; Heart Disease in her father; Hypertension in her father; Lung Disease in her mother; Pulmonary Fibrosis in her maternal grandmother; SKIN CANCER in her mother. SOCIAL HISTORY  She  reports that she has never smoked. She has never used smokeless tobacco. She reports that she does not currently use alcohol. She reports that she does not currently use drugs. Social History     Social History Narrative    Not on file       DATA  Visit Vitals  /83 (BP 1 Location: Left upper arm, BP Patient Position: Sitting, BP Cuff Size: Adult)   Pulse 67   Temp 98 °F (36.7 °C) (Oral)   Resp 16   Wt 147 lb (66.7 kg)   LMP 11/29/2022 (Approximate)   SpO2 98%   BMI 24.46 kg/m²    There is no height or weight on file to calculate BMI. No flowsheet data found.     General:  The patient is well developed, well nourished, alert, and in no apparent distress. Eyes: Sclera are anicteric. No conjunctival injection. HEENT:  Oropharynx is clear. No oral ulcers. Adequate salivary pooling. No cervical or supraclavicular lymphadenopathy. Lungs:  Clear to auscultation bilaterally, without wheeze or stridor. Normal respiratory effort. Cor:  Regular rate and rhythm. No murmur rub or gallop. Abdomen: Soft, non-tender, without hepatomegaly or masses. Extremities: No calf tenderness or edema. Warm and well perfused. Skin:  No significant abnormalities. Neuro: Nonfocal, normal unassisted gait  Musculoskeletal:    A comprehensive musculoskeletal exam was performed for all joints of each upper and lower extremity and assessed for swelling, tenderness and range of motion. Results are documented as below:  No current evidence of synovitis in the small joints of the hands, wrists, shoulders, elbows, hips, knees or ankles. Labs  11/25/22: peripheral smear: mild normocytic anemia; WBC 6.7, Hgb 10.8 (MCV 87), Plt 373  11/10/22: WBC 10.2, HGB 11.8, Plt 522, Cr 0.85, LFT WNL, Ferritin 804, Iron 23, TIBC 228, Iron %saturation 10, folate 7.5, Vit B12 968, Immunoglobulin 284, Alpha-1-Globulin 0.5, Alpha-2-Globulin 1.5, Total globulin 4.1, Reticulocyte count 0.9, Absolute retic count 0.0381, , ESR 9, CRP >9.5 mg/L, Hep B surface Ab reactive, Hep B surface Ag neg, Hep C virus Ab nonreactive, Hep B core Ab total neg  9/28/22 RF 15.2 [<14]  2/25/21 HIV nonreactive, RPR negative    Imaging    CT CHEST W CONT (11/21/22):   1. Multiple nonspecific bilateral pulmonary nodules measuring up to 7 mm, possibly infectious or inflammatory. 3 month follow-up CT reevaluation recommended. 2.  No mediastinal/hilar adenopathy. 3.  Redemonstrated subcentimeter splenic hypodensities. CT ABD PELV W CONT (10/23/22): Abnormal enlarged spleen with multiple hypodensities. Please correlate with laboratory values.  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 Multiple bilateral renal cysts. Adnexal 8 cm structure with density measurement of 22. Pelvic ultrasound recommended to differentiate between ovarian cyst and alternate pelvic mass    ASSESSMENT AND PLAN  Ms. Ben Cassidy is a 29 y.o. female who presents for evaluation of inflammatory arthralgias, elevated acute phase reactants and low-titer RF, and splenomegaly with pulmonary nodulosis. Her primary concern currently is fatigue, though reasonable to trial methotrexate pending full immunologic workup. Will review with Dr. Russ Linares if serologies are not heavily indicative of one process; bronchoscopy may be worthwhile, as histoplasmosis and atypical lung infection could conceivably present similarly. Reviewed reproductive risks with methotrexate, need for toxicity monitoring, and role for folic acid, to which she is agreeable. 1. Seropositive rheumatoid arthritis (HCC)  - methotrexate (RHEUMATREX) 2.5 mg tablet; Take 6 Tablets by mouth Every Saturday. (Patient not taking: Reported on 12/15/2022)  Dispense: 72 Tablet; Refill: 0  - C REACTIVE PROTEIN, QT; Future  - CBC WITH AUTOMATED DIFF; Future  - METABOLIC PANEL, COMPREHENSIVE; Future  - SED RATE (ESR); Future  - VITAMIN D, 1, 25 DIHYDROXY; Future  - RHEUMASSURE; Future  - ANTI-NUCLEAR AB BY IFA (RDL); Future  - ANGIOTENSIN CONVERTING ENZYME; Future  - PROTEIN ELECTROPHORESIS W/ REFLX YU; Future  - QUANTIFERON-TB GOLD PLUS; Future  - folic acid (FOLVITE) 1 mg tablet; Take 1 Tablet by mouth daily. Dispense: 90 Tablet; Refill: 5    2. Splenic lesion  - VITAMIN D, 1, 25 DIHYDROXY; Future  - ANTI-NUCLEAR AB BY IFA (RDL); Future  - ANGIOTENSIN CONVERTING ENZYME; Future  - PROTEIN ELECTROPHORESIS W/ REFLX YU; Future  - QUANTIFERON-TB GOLD PLUS; Future    3. Pulmonary nodule  - VITAMIN D, 1, 25 DIHYDROXY; Future  - ANTI-NUCLEAR AB BY IFA (RDL); Future  - ANGIOTENSIN CONVERTING ENZYME; Future  - PROTEIN ELECTROPHORESIS W/ REFLX YU;  Future  - QUANTIFERON-TB GOLD PLUS; Future    4. Hypercalcemia  - VITAMIN D, 1, 25 DIHYDROXY; Future  - ANTI-NUCLEAR AB BY IFA (RDL); Future  - ANGIOTENSIN CONVERTING ENZYME; Future  - PROTEIN ELECTROPHORESIS W/ REFLX YU; Future  - QUANTIFERON-TB GOLD PLUS; Future    5. Ongoing use of possibly toxic medication  - CBC WITH AUTOMATED DIFF; Future  - METABOLIC PANEL, COMPREHENSIVE; Future  - CBC WITH AUTOMATED DIFF  - METABOLIC PANEL, COMPREHENSIVE  - folic acid (FOLVITE) 1 mg tablet; Take 1 Tablet by mouth daily. Dispense: 90 Tablet; Refill: 5    Patient Instructions   1) I'm starting you on methotrexate. Take this medication once every 7 days (for example, every Friday) with breakfast. This can cause stomach upset or a \"hangover\"-type feeling the day after you take it, so choose a schedule  when you will not have to do a lot the next day. Start with 4 pills (10mg) the first time you take this; if you tolerate this well, then you can increase subsequent doses to 6 pills (15mg) once weekly with breakfast.    Even though you're taking this only once weekly, you should take DAILY folic acid to help reduce the risk for side effects from methotrexate such as mouth ulcers, hair thinning, nerve damage, and stomach upset. We will be doing initially once/month blood tests to monitor the risk for low blood counts, liver injury, or very rarely kidney problems while you're taking this. After 3 months, if you have been doing well with this then we can space out blood work monitoring to once every 3 months. Also, I recommend using 2 forms of contraceptive while you are on this medication. 2) You can take 650mg of Tylenol up to 3 times a day for joint pain. You can also continue to take up to 400mg of Ibuprofen for breakthrough pain. 3) Let me know if you start to bruise easier than usual.     4) Check labs in one month. 5) Follow up in 6 weeks. Let me know if you have any questions or concerns in the meantime.        Orders Placed This Encounter    QUANTIFERON-TB GOLD PLUS    C REACTIVE PROTEIN, QT    CBC WITH AUTOMATED DIFF    METABOLIC PANEL, COMPREHENSIVE    SED RATE (ESR)    VITAMIN D, 1, 25 DIHYDROXY    RHEUMASSURE    ANTI-NUCLEAR AB BY IFA (RDL)    ANGIOTENSIN CONVERTING ENZYME    PROTEIN ELECTROPHORESIS W/ REFLX YU    methotrexate (RHEUMATREX) 2.5 mg tablet    folic acid (FOLVITE) 1 mg tablet       Medications: I am having Neoma Mantle start on methotrexate and folic acid. Follow up: Return in about 6 weeks (around 1/19/2023). Face to face time: 42 minutes  Note preparation and records review day of service: 20 minutes  Total provider time day of service: 62 minutes    This was scribed by Cheryl Walter in the presence of Dr. Tank Mills. The note was reviewed and amended personally, and I agree with the above information.     Jose Guadalupe Bullard MD    Adult Rheumatology   [facility]  A Part of [facility], [address] [place] [address] Road   Phone [phone]  Fax [phone]

## 2022-12-08 NOTE — LETTER
1/3/2023    Patient: [facility]   YOB: 1990   Date of Visit: 12/8/2022     Zahira King MD  [address] [place] [id number]  Via In Basket    Dear Zahira King MD,    We recently saw Ms. Patrick Jung in the [facility] for evaluation. My notes for this consultation are attached. If you have questions, please do not hesitate to call me. I look forward to following your patient along with you.       Sincerely,  Jackie Walker MD [facility]  Cell: [phone]

## 2022-12-08 NOTE — PATIENT INSTRUCTIONS
1) I'm starting you on methotrexate. Take this medication once every 7 days (for example, every Friday) with breakfast. This can cause stomach upset or a \"hangover\"-type feeling the day after you take it, so choose a schedule  when you will not have to do a lot the next day. Start with 4 pills (10mg) the first time you take this; if you tolerate this well, then you can increase subsequent doses to 6 pills (15mg) once weekly with breakfast.    Even though you're taking this only once weekly, you should take DAILY folic acid to help reduce the risk for side effects from methotrexate such as mouth ulcers, hair thinning, nerve damage, and stomach upset. We will be doing initially once/month blood tests to monitor the risk for low blood counts, liver injury, or very rarely kidney problems while you're taking this. After 3 months, if you have been doing well with this then we can space out blood work monitoring to once every 3 months. Also, I recommend using 2 forms of contraceptive while you are on this medication. 2) You can take 650mg of Tylenol up to 3 times a day for joint pain. You can also continue to take up to 400mg of Ibuprofen for breakthrough pain. 3) Let me know if you start to bruise easier than usual.     4) Check labs in one month. 5) Follow up in 6 weeks. Let me know if you have any questions or concerns in the meantime. · Refill requested by: Patient  · Last office visit for osteoporosis: 21  · Next office visit: 22  · Medication(s) Requested: alendronate  · Dosage: 70mg  · Si tab weekly  · Quantity requested: 12  Vitamin D level every 2 years:   VITAMIND, 25 HYDROXY (ng/mL)   Date Value   2011 34.4   ·   Under 5 years of use  Refill if seen within the last 12 months  Filled per protocol

## 2022-12-15 ENCOUNTER — OFFICE VISIT (OUTPATIENT)
Dept: ONCOLOGY | Age: 32
End: 2022-12-15
Payer: MEDICAID

## 2022-12-15 VITALS
BODY MASS INDEX: 25.76 KG/M2 | WEIGHT: 154.6 LBS | HEART RATE: 70 BPM | OXYGEN SATURATION: 98 % | SYSTOLIC BLOOD PRESSURE: 122 MMHG | HEIGHT: 65 IN | DIASTOLIC BLOOD PRESSURE: 80 MMHG | TEMPERATURE: 97.5 F | RESPIRATION RATE: 18 BRPM

## 2022-12-15 DIAGNOSIS — R53.83 OTHER FATIGUE: ICD-10-CM

## 2022-12-15 DIAGNOSIS — D73.89 SPLENIC LESION: Primary | ICD-10-CM

## 2022-12-15 DIAGNOSIS — D63.8 ANEMIA OF CHRONIC DISEASE: ICD-10-CM

## 2022-12-15 DIAGNOSIS — M05.79 RHEUMATOID ARTHRITIS INVOLVING MULTIPLE SITES WITH POSITIVE RHEUMATOID FACTOR (HCC): ICD-10-CM

## 2022-12-15 DIAGNOSIS — R91.1 PULMONARY NODULE: ICD-10-CM

## 2022-12-15 NOTE — PROGRESS NOTES
1. Have you been to the ER, urgent care clinic since your last visit? Hospitalized since your last visit? No    2. Have you seen or consulted any other health care providers outside of the [address] [place] [address] since your last visit? Include any pap smears or colon screening.  Yes Pulmonology, Dr. Tanya Carson, notes in chart        Visit Vitals  /80 (BP 1 Location: Left upper arm, BP Patient Position: Sitting, BP Cuff Size: Adult)   Pulse 70   Temp 97.5 °F (36.4 °C) (Oral)   Resp 18   Ht 5' 5\" (1.651 m)   Wt 154 lb 9.6 oz (70.1 kg)   SpO2 98%   BMI 25.73 kg/m²            Chief Complaint   Patient presents with    Follow-up     Splenic lesion

## 2022-12-15 NOTE — PROGRESS NOTES
[facility] [place] at [address], [address] [facility] [facility]  Geena Doll: [phone]  F: [phone] Patient ID  Name: Dee Dee Garcia  YOB: 1990  MRN: [id number]  Referring Provider:   No referring provider defined for this encounter. Primary Care Provider:   [facility], Pcp       HEMATOLOGY/MEDICAL ONCOLOGY  NOTE   Date of Visit: 12/15/22   Reason for Evaluation:     Chief Complaint   Patient presents with    Follow-up     Splenic lesion       Subjective:     History of Present Illness:     Dee Dee Garcia is a 29 y.o. F who presents for a follow-up evaluation for splenic lesions. Fatigue:  Hair Loss: Weight Changes: gained  Date of Last Menstruation: Late November     Patient overall reports feeling stable. She is gaining weight back. She reports that she will start Methotrexate this weekend on Saturday. She reports mainly complaining of fatigue. Past Medical History:   Diagnosis Date    Lung nodule     Rheumatoid arteritis ([address]     Spleen enlarged       Past Surgical History:   Procedure Laterality Date    HX  SECTION      HX OTHER SURGICAL      LEAP      Social History     Tobacco Use    Smoking status: Never    Smokeless tobacco: Never   Substance Use Topics    Alcohol use: Not Currently     Comment: socially/weekends      Family History   Problem Relation Age of Onset    Lung Disease Mother         interstitial lung disease - ? crystallization of her lugns    Arthritis-rheumatoid Mother     SKIN CANCER Mother     Heart Disease Father     Hypertension Father     Pulmonary Fibrosis Maternal Grandmother      Current Outpatient Medications   Medication Sig    folic acid (FOLVITE) 1 mg tablet Take 1 Tablet by mouth daily. methotrexate (RHEUMATREX) 2.5 mg tablet Take 6 Tablets by mouth Every Saturday. (Patient not taking: Reported on 12/15/2022)     No current facility-administered medications for this visit.       No Known Allergies -  Review of Systems Provided by:  Patient  Review of Systems: A complete review of systems was obtained, reviewed. Pertinent findings reviewed above. Objective:   Visit Vitals  /80 (BP 1 Location: Left upper arm, BP Patient Position: Sitting, BP Cuff Size: Adult)   Pulse 70   Temp 97.5 °F (36.4 °C) (Oral)   Resp 18   Ht 5' 5\" (1.651 m)   Wt 154 lb 9.6 oz (70.1 kg)   LMP 11/29/2022 (Approximate)   SpO2 98%   BMI 25.73 kg/m²     ECOG PS: 1- Restricted in physically strenuous activity but ambulatory and able to carry out work of a light or sedentary nature, e.g., light house work, office work. Physical Exam  Constitutional: No acute distress. , Non-toxic appearance. , and Non-diaphoretic. HENT: Normocephalic and atraumatic head. Eyes: Normal Conjunctivae. Anicteric sclerae. Cardiovascular: S1,S2 auscultated. No pitting edema. Pulmonary: Normal Respiratory Effort. No wheezing. No rhonchi. Abdominal: Normal bowel sounds. Soft Abdomen to palpation. No abdominal tenderness. Skin: No jaundice. No rash. Musculoskeletal: No temporal muscle wasting on gross inspection. No myalgias on palpation. Lymph: No supraclavicular lymph node enlargement or tenderness. Neurological: Alert and oriented. No tremor on inspection. Normal Gait. Psychiatric: mood normal. normal speech rate. normal affect. Results:   I personally reviewed pertinent labs: I reviewed [facility] notes that document a positive Rheumatoid factor. I personally reviewed Epic EHR labs/results below:   Lab Results   Component Value Date/Time    WBC 6.7 11/25/2022 11:00 AM    HGB 10.8 (L) 11/25/2022 11:00 AM    HCT 33.9 (L) 11/25/2022 11:00 AM    PLATELET 807 08/84/3634 11:00 AM    MCV 87 11/25/2022 11:00 AM    ABS.  NEUTROPHILS 4.7 11/25/2022 11:00 AM     Lab Results   Component Value Date/Time    Sodium 137 11/10/2022 01:14 PM    Potassium 4.7 11/10/2022 01:14 PM    Chloride 102 11/10/2022 01:14 PM    CO2 29 11/10/2022 01:14 PM Glucose 90 11/10/2022 01:14 PM    BUN 7 11/10/2022 01:14 PM    Creatinine 0.85 11/10/2022 01:14 PM    Calcium 9.2 11/10/2022 01:14 PM     Lab Results   Component Value Date/Time    Bilirubin, total 0.3 11/10/2022 01:14 PM    ALT (SGPT) 22 11/10/2022 01:14 PM    Alk. phosphatase 43 (L) 11/10/2022 01:14 PM    Protein, total 7.2 11/10/2022 01:14 PM    Protein, total 7.6 11/10/2022 01:14 PM    Albumin 3.5 11/10/2022 01:14 PM    Globulin 4.1 (H) 11/10/2022 01:14 PM     Lab Results   Component Value Date/Time    Iron 23 (L) 11/10/2022 01:14 PM    TIBC 228 (L) 11/10/2022 01:14 PM    Iron % saturation 10 (L) 11/10/2022 01:14 PM    Ferritin 804 (H) 11/10/2022 01:14 PM       CT ABD PELV W CONT    Result Date: 10/23/2022  EXAM: CT ABD PELV W CONT INDICATION: lower abd pain, distention and bloating COMPARISON: 0 CONTRAST: 100 mL of Isovue-370. ORAL CONTRAST: 0 TECHNIQUE: Following the uneventful intravenous administration of contrast, thin axial images were obtained through the abdomen and pelvis. Coronal and sagittal reconstructions were generated. CT dose reduction was achieved through use of a standardized protocol tailored for this examination and automatic exposure control for dose modulation. FINDINGS: LOWER THORAX: Ill-defined 1-3 mm nodules in the right middle lobe and right lower lobe LIVER: No mass. BILIARY TREE: Gallbladder is within normal limits. CBD is not dilated. SPLEEN: Innumerable hypodensities within an enlarged spleen PANCREAS: No mass or ductal dilatation. ADRENALS: Unremarkable. KIDNEYS: No mass, calculus. Small hypodensities within the kidneys. 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 STOMACH: Unremarkable. SMALL BOWEL: No dilatation or wall thickening. COLON: No dilatation or wall thickening. APPENDIX: Normal in front of the cecum PERITONEUM: No ascites or pneumoperitoneum. RETROPERITONEUM: No lymphadenopathy or aortic aneurysm. REPRODUCTIVE ORGANS: Normal uterus.  Anterior to the uterus is adnexal 7.9 cm structure with density measurement of 21 HU URINARY BLADDER: No mass or calculus. BONES: No destructive bone lesion. ABDOMINAL WALL: No mass or hernia. ADDITIONAL COMMENTS: N/A     Abnormal enlarged spleen with multiple hypodensities. Please correlate with laboratory values. 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 Multiple bilateral renal cysts. Adnexal 8 cm structure with density measurement of 22. Pelvic ultrasound recommended to differentiate between ovarian cyst and alternate pelvic mass    US TRANSVAGINAL    Result Date: 10/24/2022  EXAM:  ULTRASOUND FEMALE PELVIS TRANSABDOMINAL AND TRANSVAGINAL INDICATION: Adnexal mass. COMPARISON: CT from earlier. TRANSABDOMINAL TECHNIQUE: Real-time sonography of the pelvis was performed using the urine filled bladder as an acoustic window. Multiple static images of the uterus and ovaries were obtained. FINDINGS: UTERUS: The uterus is normal in size and echotexture and measures 10.4 x 4.3 x 6.1 cm. ENDOMETRIUM: The endometrial stripe measures 16 mm. RIGHT OVARY: The right ovary measures 8.3 x 3.1 x 11 cm and contains a 7.6 x 7.5 x 10.6 cm complex cyst. There is flow in the right ovary. LEFT OVARY: The left ovary measures 4.1 x 2.0 x 2.5 cm and has normal flow. CUL-DE-SAC: There is no mass or fluid or other abnormality in the adnexa or cul-de-sac. Normal pelvic ultrasound examination. Transvaginal sonography will be performed to better evaluate. TRANSVAGINAL TECHNIQUE: Transvaginal sonography was performed with multiple static images of the uterus and ovaries obtained. FINDINGS: UTERUS: The uterus is normal. The uterus measures 8.4 x 4.4 x 6.2 cm. . ENDOMETRIUM: The endometrial stripe measures 12 mm. RIGHT OVARY: The right ovary is normal. The right ovary measures 8.1 x 10.6 x 9.2 and contains a complex 6.9 x 10.2 x 8.0 cm cyst.  There is normal color flow to the right ovary.  LEFT OVARY: The left ovary is normal. The left ovary measures 4.4 x 2.6 x 2 cm. There is normal color flow to the left ovary. CUL-DE-SAC: There is no fluid or mass or other abnormality in the pelvic cul-de-sac. IMPRESSION: Complex right ovarian 10 cm cyst, presumably a hemorrhagic cyst No evidence for ovarian torsion    US PELV NON OBS    Result Date: 10/24/2022  EXAM:  ULTRASOUND FEMALE PELVIS TRANSABDOMINAL AND TRANSVAGINAL INDICATION: Adnexal mass. COMPARISON: CT from earlier. TRANSABDOMINAL TECHNIQUE: Real-time sonography of the pelvis was performed using the urine filled bladder as an acoustic window. Multiple static images of the uterus and ovaries were obtained. FINDINGS: UTERUS: The uterus is normal in size and echotexture and measures 10.4 x 4.3 x 6.1 cm. ENDOMETRIUM: The endometrial stripe measures 16 mm. RIGHT OVARY: The right ovary measures 8.3 x 3.1 x 11 cm and contains a 7.6 x 7.5 x 10.6 cm complex cyst. There is flow in the right ovary. LEFT OVARY: The left ovary measures 4.1 x 2.0 x 2.5 cm and has normal flow. CUL-DE-SAC: There is no mass or fluid or other abnormality in the adnexa or cul-de-sac. Normal pelvic ultrasound examination. Transvaginal sonography will be performed to better evaluate. TRANSVAGINAL TECHNIQUE: Transvaginal sonography was performed with multiple static images of the uterus and ovaries obtained. FINDINGS: UTERUS: The uterus is normal. The uterus measures 8.4 x 4.4 x 6.2 cm. . ENDOMETRIUM: The endometrial stripe measures 12 mm. RIGHT OVARY: The right ovary is normal. The right ovary measures 8.1 x 10.6 x 9.2 and contains a complex 6.9 x 10.2 x 8.0 cm cyst.  There is normal color flow to the right ovary. LEFT OVARY: The left ovary is normal. The left ovary measures 4.4 x 2.6 x 2 cm. There is normal color flow to the left ovary. CUL-DE-SAC: There is no fluid or mass or other abnormality in the pelvic cul-de-sac.  IMPRESSION: Complex right ovarian 10 cm cyst, presumably a hemorrhagic cyst No evidence for ovarian torsion     Component Ref Range & Units 11/25/22 1100 11/10/22 1314 10/23/22 2127   WBC  Comment      Comment: Normal in number and morphology   RBC  Comment      Comment: Normochromic normocytic   Platelets  Comment      Comment: Normal in number and morphology   Comments  Comment      Comment: - Mild normocytic anemia   Comment: The differential diagnosis of the anemia includes   iron deficiency anemia and anemia of chronic disease. Pathologist  Comment      Comment: Reviewed by: Vanna Sheets MD, Pathologist   WBC 3.4 - 10.8 x10E3/uL 6.7  10.2 R  8.6 R    RBC 3.77 - 5.28 x10E6/uL 3.90  4.22 R  4.10 R    HGB 11.1 - 15.9 g/dL 10.8 Low   11.8 R  12.1 R    HCT 34.0 - 46.6 % 33.9 Low   38.7 R  36.6 R    MCV 79 - 97 fL 87  91.7 R  89.3 R    MCH 26.6 - 33.0 pg 27.7  28.0 R  29.5 R    MCHC 31.5 - 35.7 g/dL 31.9  30.5 R  33.1 R    RDW 11.7 - 15.4 % 13.9  14.1 R  13.8 R    PLATELET 216 - 808 D12G8/  522 High  R  253 R    NEUTROPHILS Not Estab. % 71  79 High  R  70 R    Lymphocytes Not Estab. % 16      MONOCYTES Not Estab. % 11  9 R  13 R    EOSINOPHILS Not Estab. % 2  1 R  1 R    BASOPHILS Not Estab. % 0  0 R  0 R    ABS. NEUTROPHILS 1.4 - 7.0 x10E3/uL 4.7  8.1 High  R  6.0 R    Abs Lymphocytes 0.7 - 3.1 x10E3/uL 1.0      ABS. MONOCYTES 0.1 - 0.9 x10E3/uL 0.8  0.9 R  1.2 High  R    ABS. EOSINOPHILS 0.0 - 0.4 x10E3/uL 0.1  0.1 R  0.1 R    ABS. BASOPHILS 0.0 - 0.2 x10E3/uL 0.0  0.0 R  0.0 R    IMMATURE GRANULOCYTES Not Estab. % 0  1 High  R  0 R    ABS. IMM. GRANS. 0.0 - 0.1 x10E3/uL 0.0  0.1 High  R  0.0 R    Resulting Agency  [address] [address] Laboratory           Narrative  Performed by: Kole Larson  Performed at:  [address]   [address]  [id number]   : Vanna Sheets MD, Phone:  [phone]      Specimen Collected: 11/25/22 11:00 Last Resulted: 11/29/22 13:35           Assessment and Recommendations:     1. Splenic lesion  -denies any pain. Unclear if related to any automimune disease. No further work-up. 2. Rheumatoid arthritis involving multiple sites with positive rheumatoid factor ([address]  -reportedly will start Methotrexate. She is also prescribed folic acid. 3. Other fatigue  -still feels tired. Needs primary condition treated which may improve anemia and other symptoms. 4. Pulmonary nodule  -will see Pulmonary at the end of this month. I reviewed Dr. Terese Oviedo note; she was waiting for patient's CT scan. 5. Anemia of chronic disease  -Iron Studies suggest anemia of chronic disease.  -Hemoglobin mildly low. Reviewed smear and CBC from 11/25/22. Follow-up and Dispositions    Return in about 8 months (around 8/15/2023).        Darwin Mckinnon MD  Hematology/Medical Oncology Provider  [facility]  P: [phone]    Signed By:   Oleg Levine MD

## 2022-12-30 ENCOUNTER — TRANSCRIBE ORDER (OUTPATIENT)
Dept: SCHEDULING | Age: 32
End: 2022-12-30

## 2022-12-30 DIAGNOSIS — R91.8 MULTIPLE PULMONARY NODULES: Primary | ICD-10-CM

## 2023-01-19 ENCOUNTER — OFFICE VISIT (OUTPATIENT)
Dept: RHEUMATOLOGY | Age: 33
End: 2023-01-19
Payer: MEDICAID

## 2023-01-19 VITALS
BODY MASS INDEX: 25.79 KG/M2 | OXYGEN SATURATION: 98 % | DIASTOLIC BLOOD PRESSURE: 84 MMHG | WEIGHT: 155 LBS | TEMPERATURE: 98.1 F | SYSTOLIC BLOOD PRESSURE: 133 MMHG | HEART RATE: 77 BPM | RESPIRATION RATE: 16 BRPM

## 2023-01-19 DIAGNOSIS — Z79.899 ONGOING USE OF POSSIBLY TOXIC MEDICATION: ICD-10-CM

## 2023-01-19 DIAGNOSIS — M05.9 SEROPOSITIVE RHEUMATOID ARTHRITIS (HCC): ICD-10-CM

## 2023-01-19 DIAGNOSIS — D89.2 HYPERGAMMAGLOBULINEMIA: ICD-10-CM

## 2023-01-19 DIAGNOSIS — M19.90 INFLAMMATORY ARTHRITIS: ICD-10-CM

## 2023-01-19 DIAGNOSIS — D73.89 SPLENIC LESION: ICD-10-CM

## 2023-01-19 DIAGNOSIS — R59.1 LYMPHADENOPATHY: Primary | ICD-10-CM

## 2023-01-19 RX ORDER — AMOXICILLIN AND CLAVULANATE POTASSIUM 875; 125 MG/1; MG/1
TABLET, FILM COATED ORAL
COMMUNITY
Start: 2023-01-14 | End: 2023-01-19

## 2023-01-19 RX ORDER — METHOTREXATE 2.5 MG/1
10 TABLET ORAL
Qty: 48 TABLET | Refills: 0 | Status: SHIPPED | OUTPATIENT
Start: 2023-01-21

## 2023-01-19 NOTE — PROGRESS NOTES
REASON FOR VISIT:   Yordy Lane is a 29 y.o. female with history of iron deficiency anemia who is being referred to [facility] at the request of  No ref. provider found, regarding a diagnosis of RF+ arthralgia. HISTORY OF PRESENT ILLNESS    Ms Gerardo Hunt is a new pt. Last visit 12/8/2022. Pt has been taking 6 pills of Methotrexate once per week with daily folic acid since her last visit. She says that the first week after taking her medication she had mouth sores then the second week she had constant ringing in her ears, and now she has swollen lymph nodes since Friday. When she had mouth sores she had a sore throat to the point where she could not swallow water. She no longer has mouth sores. She went to [facility] and tested negative for Mono and Strep when she had the sore throat. Pt has recently stated to feel fatigued again. She says that she had to nap the past 4 days. She does not feel refreshed when she wakes up in the morning, but she is not having trouble sleeping. She says that this fatigue is the same type of fatigue she was having before. Pt notes that about 1.5 weeks ago she developed soreness on the side of her R breast.     Pt had a breathing test since last visit that come back normal.     Pt says that she does not have any pain today. Pt denies rashes, cough, leg swelling, bruises on shins. Pt does not take iron supplements. REVIEW OF SYSTEMS  A comprehensive review of systems was negative except for that written in the HPI. A 10-point review of systems is per the new patient questionnaire, which has been reviewed extensively and scanned into the electronic medical record for future reference. Review of systems is as above and is otherwise negative. ALLERGIES  Patient has no known allergies. MEDICATIONS  Current Outpatient Medications   Medication Sig    methotrexate (RHEUMATREX) 2.5 mg tablet Take 6 Tablets by mouth Every Saturday.  (Patient not taking: Reported on 14/39/1737)    folic acid (FOLVITE) 1 mg tablet Take 1 Tablet by mouth daily. No current facility-administered medications for this visit. PAST MEDICAL HISTORY  Past Medical History:   Diagnosis Date    Lung nodule     Rheumatoid arteritis ([address]     Spleen enlarged        FAMILY HISTORY  family history includes Arthritis-rheumatoid in her mother; Heart Disease in her father; Hypertension in her father; Lung Disease in her mother; Pulmonary Fibrosis in her maternal grandmother; SKIN CANCER in her mother. SOCIAL HISTORY  She  reports that she has never smoked. She has never used smokeless tobacco. She reports that she does not currently use alcohol. She reports that she does not currently use drugs. Social History     Social History Narrative    Not on file       DATA  There were no vitals taken for this visit. There is no height or weight on file to calculate BMI. No flowsheet data found. General:  The patient is well developed, well nourished, alert, and in no apparent distress. Eyes: Sclera are anicteric. No conjunctival injection. HEENT:  Oropharynx is clear. No oral ulcers. Adequate salivary pooling. No cervical or supraclavicular lymphadenopathy. Lungs:  Clear to auscultation bilaterally, without wheeze or stridor. Normal respiratory effort. Cor:  Regular rate and rhythm. No murmur rub or gallop. Abdomen: Soft, non-tender, without hepatomegaly or masses. Extremities: No calf tenderness or edema. Warm and well perfused. Skin:  No significant abnormalities. Neuro: Nonfocal  Musculoskeletal:    A comprehensive musculoskeletal exam was performed for all joints of each upper and lower extremity and assessed for swelling, tenderness and range of motion. Results are documented as below:  No evidence of synovitis in the small joints of the hands, wrists, shoulders, elbows, hips, knees or ankles.       Labs  1/16/22: WBC 5.1, HGB 12.2, Plt 321, Cr 0.7, Alk phos 32, SPEP WNL, ACE 19, Calcitriol 58, CRP 61 , ESR 35  11/25/22: peripheral smear: mild normocytic anemia; WBC 6.7, Hgb 10.8 (MCV 87), Plt 373  11/10/22: WBC 10.2, HGB 11.8, Plt 522, Cr 0.85, LFT WNL, Ferritin 804, Iron 23, TIBC 228, Iron %saturation 10, folate 7.5, Vit B12 968, Immunoglobulin 284, Alpha-1-Globulin 0.5, Alpha-2-Globulin 1.5, Total globulin 4.1, Reticulocyte count 0.9, Absolute retic count 0.0381, , ESR 9, CRP >9.5 mg/L, Hep B surface Ab reactive, Hep B surface Ag neg, Hep C virus Ab nonreactive, Hep B core Ab total neg  9/28/22 RF 15.2 [<14]  2/25/21 HIV nonreactive, RPR negative    Imaging    CT CHEST W CONT (11/21/22):   1. Multiple nonspecific bilateral pulmonary nodules measuring up to 7 mm, possibly infectious or inflammatory. 3 month follow-up CT reevaluation recommended. 2.  No mediastinal/hilar adenopathy. 3.  Redemonstrated subcentimeter splenic hypodensities. CT ABD PELV W CONT (10/23/22): Abnormal enlarged spleen with multiple hypodensities. Please correlate with laboratory values. 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 Multiple bilateral renal cysts. Adnexal 8 cm structure with density measurement of 22. Pelvic ultrasound recommended to differentiate between ovarian cyst and alternate pelvic mass    ASSESSMENT AND PLAN  Ms. Bhavesh Burgess is a 29 y.o. female who presents for evaluation of inflammatory arthralgias, elevated acute phase reactants and low-titer RF, and splenomegaly with pulmonary nodulosis. Her primary concern currently is fatigue, though reasonable to trial methotrexate pending full immunologic workup. Will review with Dr. Wil Fernandez if serologies are not heavily indicative of one process; bronchoscopy may be worthwhile, as histoplasmosis and atypical lung infection could conceivably present similarly. Reviewed reproductive risks with methotrexate, need for toxicity monitoring, and role for folic acid, to which she is agreeable.      1. Seropositive rheumatoid arthritis (HCC)  - methotrexate (RHEUMATREX) 2.5 mg tablet; Take 6 Tablets by mouth Every Saturday. (Patient not taking: Reported on 12/15/2022)  Dispense: 72 Tablet; Refill: 0  - C REACTIVE PROTEIN, QT; Future  - CBC WITH AUTOMATED DIFF; Future  - METABOLIC PANEL, COMPREHENSIVE; Future  - SED RATE (ESR); Future  - VITAMIN D, 1, 25 DIHYDROXY; Future  - RHEUMASSURE; Future  - ANTI-NUCLEAR AB BY IFA (RDL); Future  - ANGIOTENSIN CONVERTING ENZYME; Future  - PROTEIN ELECTROPHORESIS W/ REFLX YU; Future  - QUANTIFERON-TB GOLD PLUS; Future  - folic acid (FOLVITE) 1 mg tablet; Take 1 Tablet by mouth daily. Dispense: 90 Tablet; Refill: 5    2. Splenic lesion  - VITAMIN D, 1, 25 DIHYDROXY; Future  - ANTI-NUCLEAR AB BY IFA (RDL); Future  - ANGIOTENSIN CONVERTING ENZYME; Future  - PROTEIN ELECTROPHORESIS W/ REFLX YU; Future  - QUANTIFERON-TB GOLD PLUS; Future    3. Pulmonary nodule  - VITAMIN D, 1, 25 DIHYDROXY; Future  - ANTI-NUCLEAR AB BY IFA (RDL); Future  - ANGIOTENSIN CONVERTING ENZYME; Future  - PROTEIN ELECTROPHORESIS W/ REFLX YU; Future  - QUANTIFERON-TB GOLD PLUS; Future    4. Hypercalcemia  - VITAMIN D, 1, 25 DIHYDROXY; Future  - ANTI-NUCLEAR AB BY IFA (RDL); Future  - ANGIOTENSIN CONVERTING ENZYME; Future  - PROTEIN ELECTROPHORESIS W/ REFLX YU; Future  - QUANTIFERON-TB GOLD PLUS; Future    5. Ongoing use of possibly toxic medication  - CBC WITH AUTOMATED DIFF; Future  - METABOLIC PANEL, COMPREHENSIVE; Future  - CBC WITH AUTOMATED DIFF  - METABOLIC PANEL, COMPREHENSIVE  - folic acid (FOLVITE) 1 mg tablet; Take 1 Tablet by mouth daily. Dispense: 90 Tablet; Refill: 5    There are no Patient Instructions on file for this visit. No orders of the defined types were placed in this encounter. Medications: I am having Neoma Mantle maintain her methotrexate and folic acid. Follow up: No follow-ups on file.     Face to face time: minutes  Note preparation and records review day of service: minutes  Total provider time day of service: minutes

## 2023-01-19 NOTE — PROGRESS NOTES
Chief Complaint   Patient presents with    Joint Pain     1. Have you been to the ER, urgent care clinic since your last visit? Hospitalized since your last visit? [facility] on Saturday d/t lymph nodes in neck swollen and tender. Testing for mono and strep and were negative. Currently on antibiotics. [place]. 2. Have you seen or consulted any other health care providers outside of the [address] [place] [address] since your last visit? Include any pap smears or colon screening.

## 2023-01-19 NOTE — PATIENT INSTRUCTIONS
Decrease to 4 pills of Methotrexate once per week. Also increase folic acid to 2 pills daily. Let me know if you develop more mouth ulcers. Follow through with your CT. Check labs in 2 months before your follow up. Follow up in 2 months. Let me know if you have any questions or concerns in the meantime.

## 2023-02-15 ENCOUNTER — HOSPITAL ENCOUNTER (OUTPATIENT)
Dept: CT IMAGING | Age: 33
Discharge: HOME | End: 2023-02-15
Attending: INTERNAL MEDICINE
Payer: MEDICAID

## 2023-02-15 DIAGNOSIS — R91.8 MULTIPLE PULMONARY NODULES: ICD-10-CM

## 2023-02-15 PROCEDURE — 71250 CT THORAX DX C-: CPT

## 2023-03-23 LAB
ALBUMIN SERPL-MCNC: 4.6 G/DL (ref 3.9–5)
ALBUMIN/GLOB SERPL: 2.1 {RATIO} (ref 1.2–2.2)
ALP SERPL-CCNC: 27 IU/L (ref 44–121)
ALT SERPL-CCNC: 6 IU/L (ref 0–32)
AST SERPL-CCNC: 15 IU/L (ref 0–40)
BASOPHILS # BLD AUTO: 0 X10E3/UL (ref 0–0.2)
BASOPHILS NFR BLD AUTO: 0 %
BILIRUB SERPL-MCNC: 0.3 MG/DL (ref 0–1.2)
BUN SERPL-MCNC: 13 MG/DL (ref 6–20)
BUN/CREAT SERPL: 14 (ref 9–23)
CALCIUM SERPL-MCNC: 9.4 MG/DL (ref 8.7–10.2)
CHLORIDE SERPL-SCNC: 104 MMOL/L (ref 96–106)
CO2 SERPL-SCNC: 21 MMOL/L (ref 20–29)
CREAT SERPL-MCNC: 0.9 MG/DL (ref 0.57–1)
CRP SERPL-MCNC: 2 MG/L (ref 0–10)
EGFRCR SERPLBLD CKD-EPI 2021: 88 ML/MIN/1.73
EOSINOPHIL # BLD AUTO: 0.2 X10E3/UL (ref 0–0.4)
EOSINOPHIL NFR BLD AUTO: 3 %
ERYTHROCYTE [DISTWIDTH] IN BLOOD BY AUTOMATED COUNT: 14.1 % (ref 11.7–15.4)
ERYTHROCYTE [SEDIMENTATION RATE] IN BLOOD BY WESTERGREN METHOD: 3 MM/HR (ref 0–32)
GLOBULIN SER CALC-MCNC: 2.2 G/DL (ref 1.5–4.5)
GLUCOSE SERPL-MCNC: 84 MG/DL (ref 70–99)
HCT VFR BLD AUTO: 39 % (ref 34–46.6)
HGB BLD-MCNC: 13 G/DL (ref 11.1–15.9)
IGA SERPL-MCNC: 107 MG/DL (ref 87–352)
IGG SERPL-MCNC: 885 MG/DL (ref 586–1602)
IGG4 SER-MCNC: 44 MG/DL (ref 2–96)
IGM SERPL-MCNC: 121 MG/DL (ref 26–217)
IMM GRANULOCYTES # BLD AUTO: 0 X10E3/UL (ref 0–0.1)
IMM GRANULOCYTES NFR BLD AUTO: 0 %
LYMPHOCYTES # BLD AUTO: 1.2 X10E3/UL (ref 0.7–3.1)
LYMPHOCYTES NFR BLD AUTO: 21 %
MCH RBC QN AUTO: 29.8 PG (ref 26.6–33)
MCHC RBC AUTO-ENTMCNC: 33.3 G/DL (ref 31.5–35.7)
MCV RBC AUTO: 89 FL (ref 79–97)
MONOCYTES # BLD AUTO: 0.6 X10E3/UL (ref 0.1–0.9)
MONOCYTES NFR BLD AUTO: 10 %
NEUTROPHILS # BLD AUTO: 3.8 X10E3/UL (ref 1.4–7)
NEUTROPHILS NFR BLD AUTO: 66 %
PLATELET # BLD AUTO: 303 X10E3/UL (ref 150–450)
POTASSIUM SERPL-SCNC: 4.6 MMOL/L (ref 3.5–5.2)
PROT SERPL-MCNC: 6.8 G/DL (ref 6–8.5)
RBC # BLD AUTO: 4.36 X10E6/UL (ref 3.77–5.28)
SODIUM SERPL-SCNC: 141 MMOL/L (ref 134–144)
WBC # BLD AUTO: 5.9 X10E3/UL (ref 3.4–10.8)

## 2023-04-05 ENCOUNTER — OFFICE VISIT (OUTPATIENT)
Dept: RHEUMATOLOGY | Age: 33
End: 2023-04-05
Payer: MEDICAID

## 2023-04-05 PROCEDURE — 99215 OFFICE O/P EST HI 40 MIN: CPT | Performed by: INTERNAL MEDICINE

## 2023-04-05 RX ORDER — METHOTREXATE 2.5 MG/1
10 TABLET ORAL
Qty: 52 TABLET | Refills: 0 | Status: SHIPPED
Start: 2023-04-08

## 2023-04-05 NOTE — PATIENT INSTRUCTIONS
Continue methotrexate 4 pills (10mg) once a week, with 2mg daily folic acid. Reach out with any increase in joint pain or worsening breathing problems before I leave at the end of June, and we can evaluate you urgently. Reach out now to area Rheumatology providers and try to establish with a new provider by August. If you can't get in with anyone before that, let me know and I'll order methotrexate monitoring labs for June, so we can extend your medication until you're able to get in with someone.

## 2023-04-05 NOTE — PROGRESS NOTES
Chief Complaint   Patient presents with    Joint Pain     1. Have you been to the ER, urgent care clinic since your last visit? Hospitalized since your last visit? No    2. Have you seen or consulted any other health care providers outside of the [address] [place] [address] since your last visit? Include any pap smears or colon screening.  No

## 2023-04-05 NOTE — PROGRESS NOTES
REASON FOR VISIT:   Shy Fragoso is a 29 y.o. female with history of iron deficiency anemia, splenomegaly, pulmonary nodulosis, and RF+ arthralgia returning for initial followup. [place] [facility] HISTORY OF PRESENT ILLNESS    Last visit 1/19/2023, at which time we decreased methotrexate to 10mg weekly for oral ulcers, and increased folic acid to 2mg daily. Mouth ulcers have resolved. Hair thinning has been slower to improve, but increased hairfall seems to have improved more recently. Stays at home with kids, no recent illness. 9yo is on Spring break right now. Otherwise no nausea or GI upset with methotrexate. Joints overall feel good. Only intermittently sore joint is the left 5th DIP. Difficult to pin down duration of morning stiffness, doesn't pay attention. REVIEW OF SYSTEMS  A comprehensive review of systems was negative except for that written in the HPI. A 10-point review of systems is per the new patient questionnaire, which has been reviewed extensively and scanned into the electronic medical record for future reference. Review of systems is as above and is otherwise negative. ALLERGIES  Patient has no known allergies. MEDICATIONS  Current Outpatient Medications   Medication Sig    folic acid (FOLVITE) 1 mg tablet Take 2 Tablets by mouth daily for 90 days. methotrexate (RHEUMATREX) 2.5 mg tablet Take 4 Tablets by mouth Every Saturday. No current facility-administered medications for this visit.        PAST MEDICAL HISTORY  Past Medical History:   Diagnosis Date    Lung nodule     Rheumatoid arteritis ([address]     Spleen enlarged     Trauma        FAMILY HISTORY  family history includes Alcohol abuse in her maternal grandfather; Arthritis-rheumatoid in her mother; Cancer in her maternal aunt and mother; Heart Disease in her father; Hypertension in her father; Lung Disease in her mother; OSTEOARTHRITIS in her mother; Pulmonary Fibrosis in her maternal grandmother; SKIN CANCER in her mother. SOCIAL HISTORY  She  reports that she has never smoked. She has never used smokeless tobacco. She reports current alcohol use. She reports that she does not currently use drugs. 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 Eyes: Sclera are anicteric. No conjunctival injection. HEENT:  Mild frontal-predominant alopecia. Oropharynx is clear. No oral ulcers. Adequate salivary pooling. No cervical lymphadenopathy  Lungs:  Clear to auscultation bilaterally, without wheeze or stridor. Normal respiratory effort. Cor:  Regular rate and rhythm. No murmurs rubs or gallops. Abdomen: Soft, non-tender, without hepatomegaly or masses. Extremities: No calf tenderness or edema. Warm and well perfused. Skin:  No significant abnormalities. No petechial, purpuric, or psoriaform rashes   Neuro: Nonfocal, normal unassisted gait. Musculoskeletal:    A comprehensive musculoskeletal exam was performed for all joints of each upper and lower extremity and assessed for swelling, tenderness and range of motion. Results are documented as below:  Still no evidence of synovitis in the small joints of the hands, wrists, shoulders, elbows, hips, knees or ankles. Labs  3/22/23: CBC WNL; Cr 0.9, LFTs WNL  1/16/23: WBC 5.1, HGB 12.2, Plt 321, Cr 0.7, Alk phos 32, SPEP WNL, ACE 19, Calcitriol 58, CRP 61 , ESR 35, RF 18.1, CCP neg, 14.3. 3eta WNL  11/25/22: peripheral smear: mild normocytic anemia; WBC 6.7, Hgb 10.8 (MCV 87), Plt 373  11/10/22:  WBC 10.2, HGB 11.8, Plt 522, Cr 0.85, LFT WNL, Ferritin 804, Iron 23, TIBC 228, Iron %saturation 10, folate 7.5, Vit B12 968, Immunoglobulin 284, Alpha-1-Globulin 0.5, Alpha-2-Globulin 1.5, Total globulin 4.1, Reticulocyte count 0.9, Absolute retic count 0.0381, , ESR 9, CRP >9.5 mg/L, Hep B surface Ab reactive, Hep B surface Ag neg, Hep C virus Ab nonreactive, Hep B core Ab total neg  9/28/22 RF 15.2 [<14]  2/25/21 HIV nonreactive, RPR negative    Imaging    2/15/23 CT Chest without:  1. The dominant nodule in the superior segment right lower lobe has decreased in size. Several other low density ill-defined nodules bilaterally are unchanged. There has been some improvement in the left lower lobe where the previously seen parenchymal ill-defined infiltrate and mild nodularity has resolved. Ill-defined infiltrate posteriorly in the right upper lobe has not changed significantly. These findings are most likely related to infectious or inflammatory process. 2. Spleen is not well evaluated but there are still abnormal hypodensities in the spleen    CT CHEST W CONT (11/21/22):   1. Multiple nonspecific bilateral pulmonary nodules measuring up to 7 mm, possibly infectious or inflammatory. 3 month follow-up CT reevaluation recommended. 2.  No mediastinal/hilar adenopathy. 3.  Redemonstrated subcentimeter splenic hypodensities. CT ABD PELV W CONT (10/23/22): Abnormal enlarged spleen with multiple hypodensities. Please correlate with laboratory values. 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 Multiple bilateral renal cysts. Adnexal 8 cm structure with density measurement of 22. Pelvic ultrasound recommended to differentiate between ovarian cyst and alternate pelvic mass    ASSESSMENT AND PLAN  Ms. Rosie Lucio is a 29 y.o. female who returns for followup of seropositive rheumatoid arthritis manifest with inflammatory arthralgias, elevated acute phase reactants and low-titer RF, and splenomegaly with pulmonary nodulosis.  Joint pain has improved with methotrexate, and lower dose much better tolerated. No changes to dose today. 1. Seropositive rheumatoid arthritis (HCC)  - methotrexate (RHEUMATREX) 2.5 mg tablet; Take 4 Tablets by mouth Every Saturday. Dispense: 52 Tablet; Refill: 0    2. Ongoing use of possibly toxic medication  - Cont daily folic acid    3. Splenic lesion  -Not clinically apparent, monitor for clinical splenomegaly, trend platelets with toxicity monitoring, low suspicion for malignancy    4. Pulmonary nodule  -Nodulosis improved on methotrexate, continue to trend clinically  -Following with Dr. Jesse Hui in Pulmonary, appreciate assistance      Patient Instructions   Continue methotrexate 4 pills (10mg) once a week, with 2mg daily folic acid. Reach out with any increase in joint pain or worsening breathing problems before I leave at the end of June, and we can evaluate you urgently. Reach out now to area Rheumatology providers and try to establish with a new provider by August. If you can't get in with anyone before that, let me know and I'll order methotrexate monitoring labs for June, so we can extend your medication until you're able to get in with someone. Orders Placed This Encounter    methotrexate (RHEUMATREX) 2.5 mg tablet         Medications: I am having Cassandra Hurley maintain her folic acid and methotrexate. Follow up: No follow-ups on file.     Face to face time: 24 minutes  Note preparation and records review day of service: 16 minutes  Total provider time day of service: 40 minutes    Brian Hernandez MD    Adult Rheumatology   [address], [address] [place] [address]   Phone [phone]  Fax [phone]

## 2023-06-28 DIAGNOSIS — M05.9 RHEUMATOID ARTHRITIS WITH RHEUMATOID FACTOR, UNSPECIFIED (HCC): ICD-10-CM

## 2023-06-28 DIAGNOSIS — Z79.899 OTHER LONG TERM (CURRENT) DRUG THERAPY: Primary | ICD-10-CM

## 2023-06-28 NOTE — TELEPHONE ENCOUNTER
Last visit 4/5/23  No follow up scheduled   Lab Results   Component Value Date     03/22/2023    K 4.6 03/22/2023     03/22/2023    CO2 21 03/22/2023    BUN 13 03/22/2023    CREATININE 0.90 03/22/2023    GLUCOSE 84 03/22/2023    CALCIUM 9.4 03/22/2023    PROT 6.8 03/22/2023    LABALBU 4.6 03/22/2023    BILITOT 0.3 03/22/2023    ALKPHOS 27 (L) 03/22/2023    AST 15 03/22/2023    ALT 6 03/22/2023    LABGLOM 88 03/22/2023    AGRATIO 2.1 03/22/2023    GLOB 4.1 (H) 11/10/2022     Lab Results   Component Value Date    WBC 5.9 03/22/2023    HGB 13.0 03/22/2023    HCT 39.0 03/22/2023    MCV 89 03/22/2023     03/22/2023

## 2023-06-29 DIAGNOSIS — Z79.899 OTHER LONG TERM (CURRENT) DRUG THERAPY: ICD-10-CM

## 2023-06-29 DIAGNOSIS — M05.9 RHEUMATOID ARTHRITIS WITH RHEUMATOID FACTOR, UNSPECIFIED (HCC): ICD-10-CM

## 2023-09-18 ENCOUNTER — OFFICE VISIT (OUTPATIENT)
Age: 33
End: 2023-09-18
Payer: COMMERCIAL

## 2023-09-18 VITALS
HEART RATE: 77 BPM | DIASTOLIC BLOOD PRESSURE: 81 MMHG | HEIGHT: 65 IN | RESPIRATION RATE: 18 BRPM | BODY MASS INDEX: 28.52 KG/M2 | WEIGHT: 171.2 LBS | SYSTOLIC BLOOD PRESSURE: 121 MMHG | TEMPERATURE: 98.4 F | OXYGEN SATURATION: 97 %

## 2023-09-18 DIAGNOSIS — R53.83 OTHER FATIGUE: Primary | ICD-10-CM

## 2023-09-18 DIAGNOSIS — Z79.899 HIGH RISK MEDICATION USE: ICD-10-CM

## 2023-09-18 PROCEDURE — 99214 OFFICE O/P EST MOD 30 MIN: CPT | Performed by: INTERNAL MEDICINE

## 2023-09-18 PROCEDURE — G8427 DOCREV CUR MEDS BY ELIG CLIN: HCPCS | Performed by: INTERNAL MEDICINE

## 2023-09-18 PROCEDURE — 1036F TOBACCO NON-USER: CPT | Performed by: INTERNAL MEDICINE

## 2023-09-18 PROCEDURE — G8419 CALC BMI OUT NRM PARAM NOF/U: HCPCS | Performed by: INTERNAL MEDICINE

## 2023-09-18 ASSESSMENT — PATIENT HEALTH QUESTIONNAIRE - PHQ9
SUM OF ALL RESPONSES TO PHQ QUESTIONS 1-9: 2
1. LITTLE INTEREST OR PLEASURE IN DOING THINGS: 1
SUM OF ALL RESPONSES TO PHQ QUESTIONS 1-9: 2
2. FEELING DOWN, DEPRESSED OR HOPELESS: 1
SUM OF ALL RESPONSES TO PHQ QUESTIONS 1-9: 2
SUM OF ALL RESPONSES TO PHQ QUESTIONS 1-9: 2
SUM OF ALL RESPONSES TO PHQ9 QUESTIONS 1 & 2: 2

## 2023-09-18 NOTE — PROGRESS NOTES
[facility] [address], [address] [place] [address] [place]: [phone]  F: [phone] Patient ID  Name: Cristian Crenshaw  YOB: 1990  MRN: [id number]  Referring Provider:   No referring provider defined for this encounter. Primary Care Provider:   No primary care provider on file. HEMATOLOGY/MEDICAL ONCOLOGY  NOTE     Reason for Evaluation:     Chief Complaint   Patient presents with    Follow-up       Subjective:     History of Present Illness:   Date of Visit: 09/18/23   Cristian Crenshaw is a 34 y.o. female who presents for a follow-up evaluation for  splenic lesion and fatigue. She was diagnosed with rheumatoid arthritis. Has been on methotrexate since earlier this year. She had followed with Dr. Charline Sandoval who left the practice and gave her a 3 months supply. She has been taking MTX every other week to ration her supply since she has not been able to establish with a new rheuamtologist despite her efforts. She reports that her fatigue has improved since that time of alternating her MTX. Heena Banda Constipation:Grade 1  Fatigue:Grade 2  Hair Loss:Grade 1  Anxiety:Grade 1  Pain:3 of 10. Fingers,toes, knees       Patient overall reports feeling improved. Significant improvement since being treated for RA; however, her rheumatologist has now left and she is almost out of her methotrexate. She has tried to secure a new rheumatologist (follwing-up on the names provided by Dr. Joyce Brown). However, she has not had success in reestablishing. She last took Methotrexate last week. Current Outpatient Medications   Medication Instructions    folic acid (FOLVITE) 2 mg, Oral, DAILY    methotrexate (RHEUMATREX) 2.5 MG chemo tablet TAKE 4 TABLETS BY MOUTH EVERY SATURDAY. No Known Allergies    Review of Systems Provided by:  Patient  Review of Systems: A complete review of systems was obtained, reviewed. Pertinent findings reviewed above.   Past

## 2023-12-01 LAB
ALBUMIN SERPL-MCNC: 4.5 G/DL (ref 3.9–4.9)
ALP SERPL-CCNC: 32 IU/L (ref 44–121)
ALT SERPL-CCNC: 9 IU/L (ref 0–32)
AST SERPL-CCNC: 15 IU/L (ref 0–40)
BASOPHILS # BLD AUTO: 0 X10E3/UL (ref 0–0.2)
BASOPHILS NFR BLD AUTO: 1 %
BILIRUB DIRECT SERPL-MCNC: 0.11 MG/DL (ref 0–0.4)
BILIRUB SERPL-MCNC: 0.4 MG/DL (ref 0–1.2)
BUN SERPL-MCNC: 12 MG/DL (ref 6–20)
BUN/CREAT SERPL: 15 (ref 9–23)
CALCIUM SERPL-MCNC: 9.5 MG/DL (ref 8.7–10.2)
CHLORIDE SERPL-SCNC: 105 MMOL/L (ref 96–106)
CO2 SERPL-SCNC: 23 MMOL/L (ref 20–29)
CREAT SERPL-MCNC: 0.81 MG/DL (ref 0.57–1)
EGFRCR SERPLBLD CKD-EPI 2021: 99 ML/MIN/1.73
EOSINOPHIL # BLD AUTO: 0.2 X10E3/UL (ref 0–0.4)
EOSINOPHIL NFR BLD AUTO: 4 %
ERYTHROCYTE [DISTWIDTH] IN BLOOD BY AUTOMATED COUNT: 12.1 % (ref 11.7–15.4)
FOLATE SERPL-MCNC: 11.8 NG/ML
GLUCOSE SERPL-MCNC: 93 MG/DL (ref 70–99)
HCT VFR BLD AUTO: 37.7 % (ref 34–46.6)
HGB BLD-MCNC: 12.5 G/DL (ref 11.1–15.9)
IMM GRANULOCYTES # BLD AUTO: 0 X10E3/UL (ref 0–0.1)
IMM GRANULOCYTES NFR BLD AUTO: 0 %
LYMPHOCYTES # BLD AUTO: 1.1 X10E3/UL (ref 0.7–3.1)
LYMPHOCYTES NFR BLD AUTO: 20 %
MCH RBC QN AUTO: 29.9 PG (ref 26.6–33)
MCHC RBC AUTO-ENTMCNC: 33.2 G/DL (ref 31.5–35.7)
MCV RBC AUTO: 90 FL (ref 79–97)
MONOCYTES # BLD AUTO: 0.6 X10E3/UL (ref 0.1–0.9)
MONOCYTES NFR BLD AUTO: 11 %
NEUTROPHILS # BLD AUTO: 3.4 X10E3/UL (ref 1.4–7)
NEUTROPHILS NFR BLD AUTO: 64 %
PLATELET # BLD AUTO: 274 X10E3/UL (ref 150–450)
POTASSIUM SERPL-SCNC: 4.3 MMOL/L (ref 3.5–5.2)
PROT SERPL-MCNC: 6.9 G/DL (ref 6–8.5)
RBC # BLD AUTO: 4.18 X10E6/UL (ref 3.77–5.28)
SODIUM SERPL-SCNC: 140 MMOL/L (ref 134–144)
SPECIMEN STATUS REPORT: NORMAL
WBC # BLD AUTO: 5.2 X10E3/UL (ref 3.4–10.8)

## 2024-01-05 ENCOUNTER — OFFICE VISIT (OUTPATIENT)
Age: 34
End: 2024-01-05

## 2024-01-05 VITALS — DIASTOLIC BLOOD PRESSURE: 88 MMHG | SYSTOLIC BLOOD PRESSURE: 160 MMHG

## 2024-01-05 DIAGNOSIS — N83.201 CYST OF RIGHT OVARY: ICD-10-CM

## 2024-01-05 DIAGNOSIS — R10.2 PELVIC PAIN IN FEMALE: Primary | ICD-10-CM

## 2024-01-05 NOTE — PROGRESS NOTES
Michelle Prasad is a 33 y.o. female who presents today for the following:  Chief Complaint   Patient presents with    Ultrasound     results          HPI  Patient is a 33-year-old G2,  female who presents today for ultrasound results.  She had previously had a large ovarian cyst approximately 10 cm in diameter approximately 6 months ago.    OB History          2    Para        Term   1            AB   1    Living   1         SAB        IAB        Ectopic        Molar        Multiple        Live Births                          @VS1@   OBGyn Exam   Patient is well-developed well-nourished female in no apparent distress  She is alert and oriented x 3  Pelvic ultrasound reveals a 6.7 cm right ovarian cyst with groundglass appearance which would be consistent with endometrioma pelvic ultrasound was otherwise within normal limits.  [unfilled]       Assessment:  Probable large endometrioma of right ovary  Plan: Discussed options with patient, will will follow-up for preop for laparoscopic right ovarian cystectomy, possible  Nephrectomy    No orders of the defined types were placed in this encounter.

## 2024-01-12 ENCOUNTER — OFFICE VISIT (OUTPATIENT)
Age: 34
End: 2024-01-12

## 2024-01-12 ENCOUNTER — TELEPHONE (OUTPATIENT)
Age: 34
End: 2024-01-12

## 2024-01-12 ENCOUNTER — PREP FOR PROCEDURE (OUTPATIENT)
Age: 34
End: 2024-01-12

## 2024-01-12 VITALS — WEIGHT: 176.2 LBS | BODY MASS INDEX: 29.32 KG/M2 | SYSTOLIC BLOOD PRESSURE: 125 MMHG | DIASTOLIC BLOOD PRESSURE: 80 MMHG

## 2024-01-12 DIAGNOSIS — N92.6 IRREGULAR MENSES: ICD-10-CM

## 2024-01-12 DIAGNOSIS — R10.2 PERINEAL PAIN IN FEMALE: ICD-10-CM

## 2024-01-12 DIAGNOSIS — R10.2 PELVIC PAIN IN FEMALE: ICD-10-CM

## 2024-01-12 DIAGNOSIS — N83.201 CYST OF RIGHT OVARY: ICD-10-CM

## 2024-01-12 DIAGNOSIS — Z01.818 PRE-OP EXAM: Primary | ICD-10-CM

## 2024-01-12 DIAGNOSIS — N83.201 CYST OF RIGHT OVARY: Primary | ICD-10-CM

## 2024-01-12 NOTE — PROGRESS NOTES
there is a inhomogenous ovarian cyst with groundglass appearance consistent with endometrioma present in the right ovary.  Maximum cyst diameter is 6.7 cm.     Assessment: 6.7 cm right ovarian cyst consistent with endometrioma.  Otherwise normal pelvic ultrasound.    Details    Reading Physician Reading Date Result Priority   Abimbola Cm MD  [phone] 10/24/2022 STAT     Narrative & Impression  EXAM:  ULTRASOUND FEMALE PELVIS TRANSABDOMINAL AND TRANSVAGINAL     INDICATION: Adnexal mass.     COMPARISON: CT from earlier.     TRANSABDOMINAL TECHNIQUE:  Real-time sonography of the pelvis was performed using the urine filled bladder  as an acoustic window. Multiple static images of the uterus and ovaries were  obtained.     FINDINGS:  UTERUS:  The uterus is normal in size and echotexture and measures 10.4 x 4.3 x 6.1 cm.     ENDOMETRIUM:  The endometrial stripe measures 16 mm.      RIGHT OVARY:  The right ovary measures 8.3 x 3.1 x 11 cm and contains a 7.6 x 7.5 x 10.6 cm  complex cyst. There is flow in the right ovary.     LEFT OVARY:  The left ovary measures 4.1 x 2.0 x 2.5 cm and has normal flow.     CUL-DE-SAC:  There is no mass or fluid or other abnormality in the adnexa or cul-de-sac.     IMPRESSION:  Normal pelvic ultrasound examination. Transvaginal sonography will  be performed to better evaluate.      TRANSVAGINAL TECHNIQUE: Transvaginal sonography was performed with multiple  static images of the uterus and ovaries obtained.      FINDINGS:   UTERUS:  The uterus is normal. The uterus measures 8.4 x 4.4 x 6.2 cm..      ENDOMETRIUM:  The endometrial stripe measures 12 mm.      RIGHT OVARY:  The right ovary is normal. The right ovary measures 8.1 x 10.6 x 9.2 and  contains a complex 6.9 x 10.2 x 8.0 cm cyst.  There is normal color flow to the  right ovary.     LEFT OVARY:  The left ovary is normal. The left ovary measures 4.4 x 2.6 x 2 cm. There is  normal color flow to the left ovary.     CUL-DE-SAC:  There

## 2024-01-12 NOTE — TELEPHONE ENCOUNTER
Spoke with patient and she is scheduled for surgery with Dr. Schwartz on 02/08/24 for Davinci Diagnostic Laparsocopy with right ovarian cystectmy, possible right salpingo oophorectomy. She is aware PAT will call her prior to surgery.

## 2024-01-16 ENCOUNTER — TELEPHONE (OUTPATIENT)
Age: 34
End: 2024-01-16

## 2024-01-16 RX ORDER — POLYETHYLENE GLYCOL 3350, SODIUM SULFATE ANHYDROUS, SODIUM BICARBONATE, SODIUM CHLORIDE, POTASSIUM CHLORIDE 236; 22.74; 6.74; 5.86; 2.97 G/4L; G/4L; G/4L; G/4L; G/4L
4 POWDER, FOR SOLUTION ORAL ONCE
Qty: 1 EACH | Refills: 0 | Status: SHIPPED | OUTPATIENT
Start: 2024-01-16 | End: 2024-01-16

## 2024-01-16 NOTE — TELEPHONE ENCOUNTER
Pt stated \"my appt for 1/19/2024 I would like to know if it can be scheduled for a later time that day due to a  issue. My daughter will be getting out of school early that day and I will not be able to make that time.\" Callback requested.

## 2024-01-16 NOTE — TELEPHONE ENCOUNTER
Pt called and that she can now make her appt for 01/19/24 at 10:15 am; Pt do not want to cancel appt...ccm

## 2024-01-19 ENCOUNTER — PROCEDURE VISIT (OUTPATIENT)
Age: 34
End: 2024-01-19
Payer: MEDICAID

## 2024-01-19 VITALS — WEIGHT: 173.4 LBS | DIASTOLIC BLOOD PRESSURE: 94 MMHG | SYSTOLIC BLOOD PRESSURE: 147 MMHG | BODY MASS INDEX: 28.86 KG/M2

## 2024-01-19 DIAGNOSIS — R87.612 LGSIL ON PAP SMEAR OF CERVIX: Primary | ICD-10-CM

## 2024-01-19 PROCEDURE — 57454 BX/CURETT OF CERVIX W/SCOPE: CPT | Performed by: OBSTETRICS & GYNECOLOGY

## 2024-01-19 NOTE — PROGRESS NOTES
Subjective:  Chief Complaints:        1. Recent abnormal pap smear.  2. Here for Colposcopy.:    HPI:         Michelle Prasad is a 33 y.o. female who came to today for colposcopy after abnormal pap smear findings.  Patient is doing well today and has no complaints.    ROS:  RESPIRATORY:     Negative for shortness of breath, chest pain, chest congestion, cough.  CARDIOLOGY:    Negative for dizziness, chest pain, palpitations.  FEMALE REPRODUCTIVE:    Negative for abnormal vaginal discharge, abnormal vaginal bleeding.  UROLOGY:    Negative for difficulty urinating, voiding dysfunction, frequent urination, dysuria.    Gyn History:    OB History:  OB History    Para Term  AB Living   2   1   1 1   SAB IAB Ectopic Molar Multiple Live Births                     Current Outpatient Medications   Medication Sig Dispense Refill    medroxyPROGESTERone (PROVERA) 10 MG tablet Take 1 tablet by mouth daily 10 tablet 0     No current facility-administered medications for this visit.       Objective:  Physical Examination:  GENERAL:     General Appearance: well-appearing, well-developed, no acute distress.  Hygiene: unremarkable.     Mental Status:  alert and oriented.  Mood/Affect:  pleasant.  Speech: unremarkable.  HEART:     Rhythm:  regular.  Rate:  normal.  LUNGS:     Auscultation:  CTA bilaterally, no wheezing/rhonchi/rales.  ABDOMEN:       Guarding:  none.  Tenderness:  none.  Masses:  none palpable.  Inguinal nodes:  not enlarged.  Ascites:  none.       Bowel sounds:  normoactive.  Distention:  none.  Rebound tenderness:  none.  RECTUM:     Anus:  no fissures, unremarkable.  GENITOURINARY - FEMALE:     Vagina:  pink/moist mucosa, no gross evidence of lesions, no abnormal discharge.  Cervix/cuff: normal appearance, no lesions/discharge/bleeding:  Colposcopy performed: see Procedure.  External genitalia: normal.    Assessment:  The encounter diagnosis was LGSIL on Pap smear of cervix.     Plan:  Pap smear

## 2024-01-23 LAB
CPT BILLING CODE: ABNORMAL
CPT BILLING CODE: NORMAL
DIAGNOSIS SYNOPSIS:: ABNORMAL
DIAGNOSIS SYNOPSIS:: NORMAL
DX ICD CODE: ABNORMAL
DX ICD CODE: ABNORMAL
DX ICD CODE: NORMAL
PATH REPORT.FINAL DX SPEC: ABNORMAL
PATH REPORT.FINAL DX SPEC: NORMAL
PATH REPORT.GROSS SPEC: ABNORMAL
PATH REPORT.GROSS SPEC: NORMAL
PATH REPORT.RELEVANT HX SPEC: ABNORMAL
PATH REPORT.SITE OF ORIGIN SPEC: ABNORMAL
PATH REPORT.SITE OF ORIGIN SPEC: NORMAL
PATHOLOGIST NAME: ABNORMAL
PATHOLOGIST NAME: NORMAL
PAYMENT PROCEDURE: ABNORMAL
PAYMENT PROCEDURE: NORMAL

## 2024-01-29 ENCOUNTER — HOSPITAL ENCOUNTER (OUTPATIENT)
Facility: HOSPITAL | Age: 34
Discharge: HOME | End: 2024-02-01
Payer: MEDICAID

## 2024-01-29 VITALS
OXYGEN SATURATION: 99 % | WEIGHT: 175.8 LBS | BODY MASS INDEX: 29.29 KG/M2 | DIASTOLIC BLOOD PRESSURE: 71 MMHG | HEART RATE: 77 BPM | TEMPERATURE: 97.6 F | SYSTOLIC BLOOD PRESSURE: 145 MMHG | RESPIRATION RATE: 16 BRPM | HEIGHT: 65 IN

## 2024-01-29 LAB
ABO + RH BLD: NORMAL
ALBUMIN SERPL-MCNC: 4.1 G/DL (ref 3.5–5)
ALBUMIN/GLOB SERPL: 1.1 (ref 1.1–2.2)
ALP SERPL-CCNC: 34 U/L (ref 45–117)
ALT SERPL-CCNC: 18 U/L (ref 12–78)
ANION GAP SERPL CALC-SCNC: 2 MMOL/L (ref 5–15)
APPEARANCE UR: ABNORMAL
AST SERPL W P-5'-P-CCNC: 12 U/L (ref 15–37)
BACTERIA URNS QL MICRO: ABNORMAL /HPF
BILIRUB SERPL-MCNC: 0.3 MG/DL (ref 0.2–1)
BILIRUB UR QL: NEGATIVE
BLOOD GROUP ANTIBODIES SERPL: NEGATIVE
BUN SERPL-MCNC: 12 MG/DL (ref 6–20)
BUN/CREAT SERPL: 13 (ref 12–20)
CA-I BLD-MCNC: 9.3 MG/DL (ref 8.5–10.1)
CHLORIDE SERPL-SCNC: 108 MMOL/L (ref 97–108)
CO2 SERPL-SCNC: 27 MMOL/L (ref 21–32)
COLOR UR: ABNORMAL
CREAT SERPL-MCNC: 0.89 MG/DL (ref 0.55–1.02)
EPITH CASTS URNS QL MICRO: ABNORMAL /LPF
ERYTHROCYTE [DISTWIDTH] IN BLOOD BY AUTOMATED COUNT: 13.2 % (ref 11.5–14.5)
GLOBULIN SER CALC-MCNC: 3.7 G/DL (ref 2–4)
GLUCOSE SERPL-MCNC: 96 MG/DL (ref 65–100)
GLUCOSE UR STRIP.AUTO-MCNC: NEGATIVE MG/DL
HCT VFR BLD AUTO: 40.8 % (ref 35–47)
HGB BLD-MCNC: 13.4 G/DL (ref 11.5–16)
HGB UR QL STRIP: ABNORMAL
KETONES UR QL STRIP.AUTO: NEGATIVE MG/DL
LEUKOCYTE ESTERASE UR QL STRIP.AUTO: ABNORMAL
MCH RBC QN AUTO: 29.3 PG (ref 26–34)
MCHC RBC AUTO-ENTMCNC: 32.8 G/DL (ref 30–36.5)
MCV RBC AUTO: 89.1 FL (ref 80–99)
MUCOUS THREADS URNS QL MICRO: ABNORMAL /LPF
NITRITE UR QL STRIP.AUTO: NEGATIVE
NRBC # BLD: 0 K/UL (ref 0–0.01)
NRBC BLD-RTO: 0 PER 100 WBC
PH UR STRIP: 6 (ref 5–8)
PLATELET # BLD AUTO: 285 K/UL (ref 150–400)
PMV BLD AUTO: 10.1 FL (ref 8.9–12.9)
POTASSIUM SERPL-SCNC: 3.9 MMOL/L (ref 3.5–5.1)
PROT SERPL-MCNC: 7.8 G/DL (ref 6.4–8.2)
PROT UR STRIP-MCNC: NEGATIVE MG/DL
RBC # BLD AUTO: 4.58 M/UL (ref 3.8–5.2)
RBC #/AREA URNS HPF: ABNORMAL /HPF (ref 0–5)
SODIUM SERPL-SCNC: 137 MMOL/L (ref 136–145)
SP GR UR REFRACTOMETRY: 1.02 (ref 1–1.03)
SPECIMEN EXP DATE BLD: NORMAL
URINE CULTURE IF INDICATED: ABNORMAL
UROBILINOGEN UR QL STRIP.AUTO: 0.1 EU/DL (ref 0.1–1)
WBC # BLD AUTO: 6 K/UL (ref 3.6–11)
WBC URNS QL MICRO: ABNORMAL /HPF (ref 0–4)

## 2024-01-29 PROCEDURE — 36415 COLL VENOUS BLD VENIPUNCTURE: CPT

## 2024-01-29 PROCEDURE — 81001 URINALYSIS AUTO W/SCOPE: CPT

## 2024-01-29 PROCEDURE — 86901 BLOOD TYPING SEROLOGIC RH(D): CPT

## 2024-01-29 PROCEDURE — 85027 COMPLETE CBC AUTOMATED: CPT

## 2024-01-29 PROCEDURE — 86900 BLOOD TYPING SEROLOGIC ABO: CPT

## 2024-01-29 PROCEDURE — 80053 COMPREHEN METABOLIC PANEL: CPT

## 2024-01-29 PROCEDURE — 86850 RBC ANTIBODY SCREEN: CPT

## 2024-02-08 ENCOUNTER — ANESTHESIA EVENT (OUTPATIENT)
Facility: HOSPITAL | Age: 34
End: 2024-02-08
Payer: MEDICAID

## 2024-02-08 ENCOUNTER — HOSPITAL ENCOUNTER (OUTPATIENT)
Facility: HOSPITAL | Age: 34
Discharge: HOME | End: 2024-02-08
Attending: OBSTETRICS & GYNECOLOGY | Admitting: OBSTETRICS & GYNECOLOGY
Payer: MEDICAID

## 2024-02-08 ENCOUNTER — ANESTHESIA (OUTPATIENT)
Facility: HOSPITAL | Age: 34
End: 2024-02-08
Payer: MEDICAID

## 2024-02-08 VITALS
RESPIRATION RATE: 16 BRPM | SYSTOLIC BLOOD PRESSURE: 121 MMHG | HEART RATE: 90 BPM | OXYGEN SATURATION: 96 % | DIASTOLIC BLOOD PRESSURE: 73 MMHG | BODY MASS INDEX: 29.16 KG/M2 | TEMPERATURE: 98.2 F | WEIGHT: 175 LBS | HEIGHT: 65 IN

## 2024-02-08 DIAGNOSIS — R10.2 PERINEAL PAIN IN FEMALE: ICD-10-CM

## 2024-02-08 DIAGNOSIS — G89.18 POST-OP PAIN: Primary | ICD-10-CM

## 2024-02-08 DIAGNOSIS — N83.201 CYST OF RIGHT OVARY: ICD-10-CM

## 2024-02-08 PROBLEM — N83.202 BILATERAL OVARIAN CYSTS: Status: ACTIVE | Noted: 2024-02-08

## 2024-02-08 LAB — HCG UR QL: NEGATIVE

## 2024-02-08 PROCEDURE — 58662 LAPAROSCOPY EXCISE LESIONS: CPT | Performed by: OBSTETRICS & GYNECOLOGY

## 2024-02-08 PROCEDURE — 88305 TISSUE EXAM BY PATHOLOGIST: CPT

## 2024-02-08 PROCEDURE — 7100000010 HC PHASE II RECOVERY - FIRST 15 MIN: Performed by: OBSTETRICS & GYNECOLOGY

## 2024-02-08 PROCEDURE — 7100000011 HC PHASE II RECOVERY - ADDTL 15 MIN: Performed by: OBSTETRICS & GYNECOLOGY

## 2024-02-08 PROCEDURE — 6370000000 HC RX 637 (ALT 250 FOR IP): Performed by: NURSE PRACTITIONER

## 2024-02-08 PROCEDURE — 6360000002 HC RX W HCPCS: Performed by: NURSE PRACTITIONER

## 2024-02-08 PROCEDURE — 7100000001 HC PACU RECOVERY - ADDTL 15 MIN: Performed by: OBSTETRICS & GYNECOLOGY

## 2024-02-08 PROCEDURE — 2720000010 HC SURG SUPPLY STERILE: Performed by: OBSTETRICS & GYNECOLOGY

## 2024-02-08 PROCEDURE — 7100000000 HC PACU RECOVERY - FIRST 15 MIN: Performed by: OBSTETRICS & GYNECOLOGY

## 2024-02-08 PROCEDURE — 3700000000 HC ANESTHESIA ATTENDED CARE: Performed by: OBSTETRICS & GYNECOLOGY

## 2024-02-08 PROCEDURE — 2580000003 HC RX 258: Performed by: OBSTETRICS & GYNECOLOGY

## 2024-02-08 PROCEDURE — 6360000002 HC RX W HCPCS: Performed by: ANESTHESIOLOGY

## 2024-02-08 PROCEDURE — 2500000003 HC RX 250 WO HCPCS: Performed by: ANESTHESIOLOGY

## 2024-02-08 PROCEDURE — 2500000003 HC RX 250 WO HCPCS: Performed by: NURSE PRACTITIONER

## 2024-02-08 PROCEDURE — S2900 ROBOTIC SURGICAL SYSTEM: HCPCS | Performed by: OBSTETRICS & GYNECOLOGY

## 2024-02-08 PROCEDURE — 3600000009 HC SURGERY ROBOT BASE: Performed by: OBSTETRICS & GYNECOLOGY

## 2024-02-08 PROCEDURE — 2709999900 HC NON-CHARGEABLE SUPPLY: Performed by: OBSTETRICS & GYNECOLOGY

## 2024-02-08 PROCEDURE — 6360000002 HC RX W HCPCS: Performed by: OBSTETRICS & GYNECOLOGY

## 2024-02-08 PROCEDURE — 3700000001 HC ADD 15 MINUTES (ANESTHESIA): Performed by: OBSTETRICS & GYNECOLOGY

## 2024-02-08 PROCEDURE — 81025 URINE PREGNANCY TEST: CPT

## 2024-02-08 PROCEDURE — 36415 COLL VENOUS BLD VENIPUNCTURE: CPT

## 2024-02-08 PROCEDURE — 3600000019 HC SURGERY ROBOT ADDTL 15MIN: Performed by: OBSTETRICS & GYNECOLOGY

## 2024-02-08 PROCEDURE — 58661 LAPAROSCOPY REMOVE ADNEXA: CPT | Performed by: OBSTETRICS & GYNECOLOGY

## 2024-02-08 RX ORDER — HYDROMORPHONE HYDROCHLORIDE 1 MG/ML
0.5 INJECTION, SOLUTION INTRAMUSCULAR; INTRAVENOUS; SUBCUTANEOUS EVERY 5 MIN PRN
Status: DISCONTINUED | OUTPATIENT
Start: 2024-02-08 | End: 2024-02-08 | Stop reason: HOSPADM

## 2024-02-08 RX ORDER — GLUCAGON 1 MG/ML
1 KIT INJECTION PRN
Status: DISCONTINUED | OUTPATIENT
Start: 2024-02-08 | End: 2024-02-08 | Stop reason: HOSPADM

## 2024-02-08 RX ORDER — FENTANYL CITRATE 50 UG/ML
50 INJECTION, SOLUTION INTRAMUSCULAR; INTRAVENOUS EVERY 5 MIN PRN
Status: DISCONTINUED | OUTPATIENT
Start: 2024-02-08 | End: 2024-02-08 | Stop reason: HOSPADM

## 2024-02-08 RX ORDER — ONDANSETRON 2 MG/ML
INJECTION INTRAMUSCULAR; INTRAVENOUS PRN
Status: DISCONTINUED | OUTPATIENT
Start: 2024-02-08 | End: 2024-02-08 | Stop reason: SDUPTHER

## 2024-02-08 RX ORDER — LORAZEPAM 2 MG/ML
0.5 INJECTION INTRAMUSCULAR
Status: DISCONTINUED | OUTPATIENT
Start: 2024-02-08 | End: 2024-02-08 | Stop reason: HOSPADM

## 2024-02-08 RX ORDER — DIPHENHYDRAMINE HYDROCHLORIDE 50 MG/ML
12.5 INJECTION INTRAMUSCULAR; INTRAVENOUS
Status: DISCONTINUED | OUTPATIENT
Start: 2024-02-08 | End: 2024-02-08 | Stop reason: HOSPADM

## 2024-02-08 RX ORDER — METOCLOPRAMIDE HYDROCHLORIDE 5 MG/ML
10 INJECTION INTRAMUSCULAR; INTRAVENOUS
Status: DISCONTINUED | OUTPATIENT
Start: 2024-02-08 | End: 2024-02-08 | Stop reason: HOSPADM

## 2024-02-08 RX ORDER — DEXTROSE MONOHYDRATE 100 MG/ML
INJECTION, SOLUTION INTRAVENOUS CONTINUOUS PRN
Status: DISCONTINUED | OUTPATIENT
Start: 2024-02-08 | End: 2024-02-08 | Stop reason: HOSPADM

## 2024-02-08 RX ORDER — PROCHLORPERAZINE EDISYLATE 5 MG/ML
5 INJECTION INTRAMUSCULAR; INTRAVENOUS ONCE
Status: COMPLETED | OUTPATIENT
Start: 2024-02-08 | End: 2024-02-08

## 2024-02-08 RX ORDER — DEXAMETHASONE SODIUM PHOSPHATE 4 MG/ML
4 INJECTION, SOLUTION INTRA-ARTICULAR; INTRALESIONAL; INTRAMUSCULAR; INTRAVENOUS; SOFT TISSUE ONCE
Status: COMPLETED | OUTPATIENT
Start: 2024-02-08 | End: 2024-02-08

## 2024-02-08 RX ORDER — OXYCODONE HYDROCHLORIDE AND ACETAMINOPHEN 5; 325 MG/1; MG/1
1 TABLET ORAL EVERY 6 HOURS PRN
Qty: 28 TABLET | Refills: 0 | Status: SHIPPED | OUTPATIENT
Start: 2024-02-08 | End: 2024-02-15

## 2024-02-08 RX ORDER — SODIUM CHLORIDE 0.9 % (FLUSH) 0.9 %
5-40 SYRINGE (ML) INJECTION EVERY 12 HOURS SCHEDULED
Status: DISCONTINUED | OUTPATIENT
Start: 2024-02-08 | End: 2024-02-08 | Stop reason: HOSPADM

## 2024-02-08 RX ORDER — ONDANSETRON 2 MG/ML
4 INJECTION INTRAMUSCULAR; INTRAVENOUS
Status: COMPLETED | OUTPATIENT
Start: 2024-02-08 | End: 2024-02-08

## 2024-02-08 RX ORDER — SODIUM CHLORIDE 0.9 % (FLUSH) 0.9 %
5-40 SYRINGE (ML) INJECTION PRN
Status: DISCONTINUED | OUTPATIENT
Start: 2024-02-08 | End: 2024-02-08 | Stop reason: HOSPADM

## 2024-02-08 RX ORDER — SCOLOPAMINE TRANSDERMAL SYSTEM 1 MG/1
PATCH, EXTENDED RELEASE TRANSDERMAL
Status: COMPLETED
Start: 2024-02-08 | End: 2024-02-08

## 2024-02-08 RX ORDER — SODIUM CHLORIDE, SODIUM LACTATE, POTASSIUM CHLORIDE, CALCIUM CHLORIDE 600; 310; 30; 20 MG/100ML; MG/100ML; MG/100ML; MG/100ML
INJECTION, SOLUTION INTRAVENOUS CONTINUOUS
Status: DISCONTINUED | OUTPATIENT
Start: 2024-02-08 | End: 2024-02-08 | Stop reason: HOSPADM

## 2024-02-08 RX ORDER — MIDAZOLAM HYDROCHLORIDE 2 MG/2ML
INJECTION, SOLUTION INTRAMUSCULAR; INTRAVENOUS PRN
Status: DISCONTINUED | OUTPATIENT
Start: 2024-02-08 | End: 2024-02-08 | Stop reason: SDUPTHER

## 2024-02-08 RX ORDER — LIDOCAINE HYDROCHLORIDE 20 MG/ML
INJECTION, SOLUTION EPIDURAL; INFILTRATION; INTRACAUDAL; PERINEURAL PRN
Status: DISCONTINUED | OUTPATIENT
Start: 2024-02-08 | End: 2024-02-08 | Stop reason: SDUPTHER

## 2024-02-08 RX ORDER — SODIUM CHLORIDE 9 MG/ML
INJECTION, SOLUTION INTRAVENOUS PRN
Status: DISCONTINUED | OUTPATIENT
Start: 2024-02-08 | End: 2024-02-08 | Stop reason: HOSPADM

## 2024-02-08 RX ORDER — ROCURONIUM BROMIDE 10 MG/ML
INJECTION, SOLUTION INTRAVENOUS PRN
Status: DISCONTINUED | OUTPATIENT
Start: 2024-02-08 | End: 2024-02-08 | Stop reason: SDUPTHER

## 2024-02-08 RX ORDER — OXYCODONE HYDROCHLORIDE 5 MG/1
10 TABLET ORAL PRN
Status: DISCONTINUED | OUTPATIENT
Start: 2024-02-08 | End: 2024-02-08 | Stop reason: HOSPADM

## 2024-02-08 RX ORDER — KETOROLAC TROMETHAMINE 30 MG/ML
INJECTION, SOLUTION INTRAMUSCULAR; INTRAVENOUS PRN
Status: DISCONTINUED | OUTPATIENT
Start: 2024-02-08 | End: 2024-02-08 | Stop reason: SDUPTHER

## 2024-02-08 RX ORDER — PROPOFOL 10 MG/ML
INJECTION, EMULSION INTRAVENOUS PRN
Status: DISCONTINUED | OUTPATIENT
Start: 2024-02-08 | End: 2024-02-08 | Stop reason: SDUPTHER

## 2024-02-08 RX ORDER — FENTANYL CITRATE 50 UG/ML
INJECTION, SOLUTION INTRAMUSCULAR; INTRAVENOUS PRN
Status: DISCONTINUED | OUTPATIENT
Start: 2024-02-08 | End: 2024-02-08 | Stop reason: SDUPTHER

## 2024-02-08 RX ORDER — OXYCODONE HYDROCHLORIDE 5 MG/1
5 TABLET ORAL PRN
Status: DISCONTINUED | OUTPATIENT
Start: 2024-02-08 | End: 2024-02-08 | Stop reason: HOSPADM

## 2024-02-08 RX ORDER — FAMOTIDINE 10 MG/ML
20 INJECTION, SOLUTION INTRAVENOUS ONCE
Status: COMPLETED | OUTPATIENT
Start: 2024-02-08 | End: 2024-02-08

## 2024-02-08 RX ORDER — SODIUM CHLORIDE, SODIUM LACTATE, POTASSIUM CHLORIDE, CALCIUM CHLORIDE 600; 310; 30; 20 MG/100ML; MG/100ML; MG/100ML; MG/100ML
INJECTION, SOLUTION INTRAVENOUS ONCE
Status: DISCONTINUED | OUTPATIENT
Start: 2024-02-08 | End: 2024-02-08 | Stop reason: HOSPADM

## 2024-02-08 RX ORDER — KETAMINE HCL IN NACL, ISO-OSM 100MG/10ML
SYRINGE (ML) INJECTION PRN
Status: DISCONTINUED | OUTPATIENT
Start: 2024-02-08 | End: 2024-02-08 | Stop reason: SDUPTHER

## 2024-02-08 RX ORDER — SCOLOPAMINE TRANSDERMAL SYSTEM 1 MG/1
PATCH, EXTENDED RELEASE TRANSDERMAL PRN
Status: DISCONTINUED | OUTPATIENT
Start: 2024-02-08 | End: 2024-02-08 | Stop reason: SDUPTHER

## 2024-02-08 RX ORDER — BUPIVACAINE HYDROCHLORIDE 2.5 MG/ML
INJECTION, SOLUTION EPIDURAL; INFILTRATION; INTRACAUDAL PRN
Status: DISCONTINUED | OUTPATIENT
Start: 2024-02-08 | End: 2024-02-08 | Stop reason: HOSPADM

## 2024-02-08 RX ORDER — IBUPROFEN 600 MG/1
600 TABLET ORAL 3 TIMES DAILY PRN
Qty: 30 TABLET | Refills: 0 | Status: SHIPPED | OUTPATIENT
Start: 2024-02-08

## 2024-02-08 RX ORDER — DEXAMETHASONE SODIUM PHOSPHATE 4 MG/ML
INJECTION, SOLUTION INTRA-ARTICULAR; INTRALESIONAL; INTRAMUSCULAR; INTRAVENOUS; SOFT TISSUE PRN
Status: DISCONTINUED | OUTPATIENT
Start: 2024-02-08 | End: 2024-02-08 | Stop reason: SDUPTHER

## 2024-02-08 RX ADMIN — PROCHLORPERAZINE EDISYLATE 5 MG: 5 INJECTION, SOLUTION INTRAMUSCULAR; INTRAVENOUS at 17:58

## 2024-02-08 RX ADMIN — CEFAZOLIN SODIUM 2000 MG: 1 INJECTION, POWDER, FOR SOLUTION INTRAMUSCULAR; INTRAVENOUS at 13:29

## 2024-02-08 RX ADMIN — ROCURONIUM BROMIDE 10 MG: 10 INJECTION, SOLUTION INTRAVENOUS at 14:30

## 2024-02-08 RX ADMIN — HYDROMORPHONE HYDROCHLORIDE 0.5 MG: 1 INJECTION, SOLUTION INTRAMUSCULAR; INTRAVENOUS; SUBCUTANEOUS at 15:25

## 2024-02-08 RX ADMIN — ROCURONIUM BROMIDE 50 MG: 10 INJECTION, SOLUTION INTRAVENOUS at 13:36

## 2024-02-08 RX ADMIN — PROCHLORPERAZINE EDISYLATE 5 MG: 5 INJECTION, SOLUTION INTRAMUSCULAR; INTRAVENOUS at 18:41

## 2024-02-08 RX ADMIN — Medication 25 MG: at 13:55

## 2024-02-08 RX ADMIN — FENTANYL CITRATE 50 MCG: 50 INJECTION, SOLUTION INTRAMUSCULAR; INTRAVENOUS at 13:32

## 2024-02-08 RX ADMIN — ONDANSETRON 4 MG: 2 INJECTION INTRAMUSCULAR; INTRAVENOUS at 13:47

## 2024-02-08 RX ADMIN — KETOROLAC TROMETHAMINE 30 MG: 30 INJECTION, SOLUTION INTRAMUSCULAR at 14:55

## 2024-02-08 RX ADMIN — MIDAZOLAM HYDROCHLORIDE 2 MG: 1 INJECTION, SOLUTION INTRAMUSCULAR; INTRAVENOUS at 13:29

## 2024-02-08 RX ADMIN — SODIUM CHLORIDE, POTASSIUM CHLORIDE, SODIUM LACTATE AND CALCIUM CHLORIDE: 600; 310; 30; 20 INJECTION, SOLUTION INTRAVENOUS at 10:48

## 2024-02-08 RX ADMIN — METRONIDAZOLE 500 MG: 500 INJECTION, SOLUTION INTRAVENOUS at 13:29

## 2024-02-08 RX ADMIN — DEXAMETHASONE SODIUM PHOSPHATE 4 MG: 4 INJECTION INTRA-ARTICULAR; INTRALESIONAL; INTRAMUSCULAR; INTRAVENOUS; SOFT TISSUE at 13:47

## 2024-02-08 RX ADMIN — DEXAMETHASONE SODIUM PHOSPHATE 4 MG: 4 INJECTION, SOLUTION INTRAMUSCULAR; INTRAVENOUS at 18:02

## 2024-02-08 RX ADMIN — SUGAMMADEX 200 MG: 100 INJECTION, SOLUTION INTRAVENOUS at 15:18

## 2024-02-08 RX ADMIN — HYDROMORPHONE HYDROCHLORIDE 1 MG: 1 INJECTION, SOLUTION INTRAMUSCULAR; INTRAVENOUS; SUBCUTANEOUS at 15:37

## 2024-02-08 RX ADMIN — PROPOFOL 100 MG: 10 INJECTION, EMULSION INTRAVENOUS at 13:35

## 2024-02-08 RX ADMIN — SCOPALAMINE 1 PATCH: 1 PATCH, EXTENDED RELEASE TRANSDERMAL at 13:29

## 2024-02-08 RX ADMIN — Medication 25 MG: at 13:35

## 2024-02-08 RX ADMIN — FAMOTIDINE 20 MG: 10 INJECTION, SOLUTION INTRAVENOUS at 17:49

## 2024-02-08 RX ADMIN — HYDROMORPHONE HYDROCHLORIDE 0.5 MG: 1 INJECTION, SOLUTION INTRAMUSCULAR; INTRAVENOUS; SUBCUTANEOUS at 15:03

## 2024-02-08 RX ADMIN — FENTANYL CITRATE 50 MCG: 50 INJECTION, SOLUTION INTRAMUSCULAR; INTRAVENOUS at 13:56

## 2024-02-08 RX ADMIN — LIDOCAINE HYDROCHLORIDE 100 MG: 20 INJECTION, SOLUTION EPIDURAL; INFILTRATION; INTRACAUDAL; PERINEURAL at 13:32

## 2024-02-08 RX ADMIN — ONDANSETRON 4 MG: 2 INJECTION INTRAMUSCULAR; INTRAVENOUS at 16:54

## 2024-02-08 ASSESSMENT — PAIN SCALES - GENERAL: PAINLEVEL_OUTOF10: 0

## 2024-02-08 ASSESSMENT — PAIN DESCRIPTION - DESCRIPTORS
DESCRIPTORS: CRAMPING
DESCRIPTORS: CRAMPING
DESCRIPTORS: ACHING

## 2024-02-08 ASSESSMENT — PAIN - FUNCTIONAL ASSESSMENT
PAIN_FUNCTIONAL_ASSESSMENT: 0-10

## 2024-02-08 NOTE — OP NOTE
Name: Michelle Prasad   Medical Record Number: [id number]   YOB: 1990  Today's Date: February 8, 2024      Pre-operative Diagnosis: Perineal pain in female [R10.2]  Cyst of right ovary [N83.201]    Post-operative Diagnosis: 10 cm RIGHT OVARIAN CYST ( ENDOMETRIOMA )    Operation:  Procedure(s):  DAVINCI DIAGNOSTIC LAPAROSCOPY WITH RIGHT OVARIAN CYSTECTOMY AND PARTIAL RIGHT OOPHORECTOMY    Surgeon(s):  Jose De Jesus Schwartz MD    ASSISTANT: MARTHA SANCHEZ  Anesthesia: General    Prophylactic Antibiotics: Ancef  DVT Prophylaxis: Sequential Compression Devices     Specimens: RIGHT OVARIAN CYST AND PORTION OF THE OVARY           Complications:  none    Procedure Detail:      After consent was obtained patient was taken to the operating room with a running IV. Patient was then intubated and placed in the lithotomy position. Patient was then prepped and draped in usual sterile fashion and time out was performed. Sterile speculum was placed in patient's vagina with good visualization of the cervix. Cervix was grasped with a single-tooth tenaculum and the uterine manipulator was then placed. A sterile Rueda was placed to monitor urinary output during procedure. Attention was then turned to the abdomen. Supraumbilical skin incision was made with 11 blade and Veress needle placed with a positive saline drop confirming intra-abdominal placement. Abdomen was then insufflated with the CO2 maintaining 15 mmHg. Patient was placed in Trendelenburg position and 8 mm scope inserted. Upon insertion of the scope, pelvis was inspected and cm ovarian cust was visualized. 2 additional 8 mm trochars were placed in the left and right lower quadrant and additional 12 mm tochar 5 cm from superior iliac spine.. Karo Internetinci robotic system was then docked without problems. Hot shear was placed on the right side and fenestrated bipolar forceps on the left. Ovarian cyst was then grasped with a nontraumatic grasper and cyst was dissected off the

## 2024-02-08 NOTE — ANESTHESIA POSTPROCEDURE EVALUATION
Department of Anesthesiology  Postprocedure Note    Patient: Michelle Prasad  MRN: [id number]  YOB: 1990  Date of evaluation: 2/8/2024    Procedure Summary       Date: 02/08/24 Room / Location: [facility] MAIN OR 06 / [facility] MAIN OR    Anesthesia Start: 1329 Anesthesia Stop: 1539    Procedure: DAVINCI DIAGNOSTIC LAPAROSCOPY WITH RIGHT OVARIAN CYSTECTOMY (Right: Abdomen) Diagnosis:       Perineal pain in female      Cyst of right ovary      (Perineal pain in female [R10.2])      (Cyst of right ovary [N83.201])    Surgeons: Jose De Jesus Schwartz MD Responsible Provider: Saranya Tesfaye MD    Anesthesia Type: general ASA Status: 2            Anesthesia Type: No value filed.    Genie Phase I: Genie Score: 10    Genie Phase II:      Anesthesia Post Evaluation    Patient location during evaluation: PACU  Patient participation: complete - patient participated  Level of consciousness: awake  Airway patency: patent  Nausea & Vomiting: no nausea and no vomiting  Cardiovascular status: hemodynamically stable  Respiratory status: acceptable  Hydration status: euvolemic  Pain management: adequate    No notable events documented.

## 2024-02-08 NOTE — H&P
Gynecology History and Physical    Name: Michelle Prasad MRN: [id number] SSN: [id number]    YOB: 1990  Age: 33 y.o.  Sex: female       Subjective:      Chief complaint:  Right Ovarian cyst     Michelle is a 33 y.o.  female with a history of right ovarian cyst . . Previous treatment measures included observation. She is admitted for Procedure(s) (LRB):  DAVINCI DIAGNOSTIC LAPAROSCOPY WITH RIGHT OVARIAN CYSTECTOMY, POSSIBLE RIGHT SALPINGO-OOPHERECTOMY (Right).    The current method of family planning is tubal ligation.    OB History          2    Para        Term   1            AB   1    Living   1         SAB        IAB        Ectopic        Molar        Multiple        Live Births                  Past Medical History:   Diagnosis Date    Abnormal Pap smear of cervix     Autoimmune disorder (HCC)     Lung nodule     Ovarian cyst     right    Rheumatoid arteritis (HCC)     Spleen enlarged     Trauma      Past Surgical History:   Procedure Laterality Date     SECTION      COLPOSCOPY      LEEP      OTHER SURGICAL HISTORY      LEAP     Social History     Occupational History    Not on file   Tobacco Use    Smoking status: Never    Smokeless tobacco: Never   Vaping Use    Vaping Use: Never used   Substance and Sexual Activity    Alcohol use: Not Currently     Comment: Occ.    Drug use: Never    Sexual activity: Yes     Partners: Male     Birth control/protection: None     Family History   Problem Relation Age of Onset    Lung Disease Mother         interstitial lung disease - ? crystallization of her lugns    Rheum Arthritis Mother     Osteoarthritis Mother     Cancer Mother         Skin    Heart Disease Father     Hypertension Father     Alcohol Abuse Maternal Grandfather     Pulmonary Fibrosis Maternal Grandmother     Cancer Maternal Aunt         Skin        No Known Allergies  Prior to Admission medications    Medication Sig Start Date End Date Taking? Authorizing Provider    medroxyPROGESTERone (PROVERA) 10 MG tablet Take 1 tablet by mouth daily  Patient not taking: Reported on 1/29/2024 12/18/23   Rudy Corona Jr., MD        Review of Systems:  A comprehensive review of systems was negative except for that written in the History of Present Illness.     Objective:     Vitals:    02/08/24 1025   BP: 139/89   Pulse: 73   Resp: 18   Temp: 97.7 °F (36.5 °C)   TempSrc: Oral   SpO2: 97%   Weight: 79.4 kg (175 lb)   Height: 1.651 m (5' 5\")       Physical Exam:  Constitutional  Appearance: well-nourished, well developed, alert, in no acute distress    HENT  Head and Face: appears normal    Neck  Inspection/Palpation: normal appearance, no masses or tenderness  Lymph Nodes: no lymphadenopathy present  Thyroid: gland size normal, nontender, no nodules or masses present on palpation    Breasts  Symmetric, no palpable masses, no tenderness, no skin changes, no nipple abnormality, no nipple discharge, no lymphadenopathy.    Chest  Respiratory Effort: breathing labored  Auscultation: normal breath sounds    Cardiovascular  Heart:  Auscultation: regular rate and rhythm without murmur    Gastrointestinal  Abdominal Examination: abdomen non-tender to palpation, normal bowel sounds, no masses present  Liver and spleen: no hepatomegaly present, spleen not palpable  Hernias: no hernias identified    Genitourinary  External Genitalia: normal appearance for age, no discharge present, no tenderness present, no inflammatory lesions present, no masses present, no atrophy present  Vagina: normal vaginal vault without central or paravaginal defects, no discharge present, no inflammatory lesions present, no masses present  Bladder: non-tender to palpation  Urethra: appears normal  Cervix: normal   Uterus: normal size, shape and consistency  Adnexa: no adnexal tenderness present, no adnexal masses present  Perineum: perineum within normal limits, no evidence of trauma, no rashes or skin lesions

## 2024-02-08 NOTE — ANESTHESIA PRE PROCEDURE
Department of Anesthesiology  Preprocedure Note       Name:  Michelle Prasad   Age:  33 y.o.  :  1990                                          MRN:  [id number]         Date:  2024      Surgeon: Surgeon(s):  Jose De Jesus Schwartz MD    Procedure: Procedure(s):  DAVINCI DIAGNOSTIC LAPAROSCOPY WITH RIGHT OVARIAN CYSTECTOMY, POSSIBLE RIGHT SALPINGO-OOPHERECTOMY    Medications prior to admission:   Prior to Admission medications    Medication Sig Start Date End Date Taking? Authorizing Provider   medroxyPROGESTERone (PROVERA) 10 MG tablet Take 1 tablet by mouth daily  Patient not taking: Reported on 23   Rudy Corona Jr., MD       Current medications:    Current Facility-Administered Medications   Medication Dose Route Frequency Provider Last Rate Last Admin    lactated ringers IV soln infusion   IntraVENous Continuous Jose De Jesus Schwartz MD 20 mL/hr at 24 1048 New Bag at 24 1048    ceFAZolin (ANCEF) 2,000 mg in sterile water 20 mL IV syringe  2,000 mg IntraVENous Once Jose De Jesus Schwartz MD        metroNIDAZOLE (FLAGYL) 500 mg IVPB  500 mg IntraVENous Once Jose De Jesus Schwartz MD           Allergies:  No Known Allergies    Problem List:    Patient Active Problem List   Diagnosis Code    Splenic lesion D73.89    Pulmonary nodule R91.1    Weight loss R63.4    Rheumatoid arthritis involving multiple sites with positive rheumatoid factor (HCC) M05.79    Other fatigue R53.83    Perineal pain in female R10.2    Cyst of right ovary N83.201    Bilateral ovarian cysts N83.201, N83.202       Past Medical History:        Diagnosis Date    Abnormal Pap smear of cervix     Autoimmune disorder (HCC)     Lung nodule     Ovarian cyst     right    Rheumatoid arteritis (HCC)     Spleen enlarged     Trauma        Past Surgical History:        Procedure Laterality Date     SECTION      COLPOSCOPY      LEEP      OTHER SURGICAL HISTORY      LEAP       Social History:    Social History     Tobacco

## 2024-02-09 NOTE — PERIOP NOTE
Notified cliff Navarrete, about patient's nausea/vomiting after giving patient 2nd dose of zofran IV, see MAR. He ordered compazine 5mg then 5mg, if 1st 5mg doesn't help nausea/vomiting, pepcid 20mg IV and decadron 4mg IV, see MAR for orders.

## 2024-02-09 NOTE — PERIOP NOTE
Patient attempted to empty bladder before leaving but she was unable to. She stated she did not want to stay any longer that she was ready to go, so I educated and advised patient to drink plenty of fluids and if she does not void within 8 hours, she needs to go to ER.

## 2024-03-06 ENCOUNTER — OFFICE VISIT (OUTPATIENT)
Age: 34
End: 2024-03-06

## 2024-03-06 VITALS
HEIGHT: 65 IN | SYSTOLIC BLOOD PRESSURE: 132 MMHG | DIASTOLIC BLOOD PRESSURE: 80 MMHG | WEIGHT: 176.06 LBS | BODY MASS INDEX: 29.33 KG/M2

## 2024-03-06 DIAGNOSIS — Z09 POSTOP CHECK: Primary | ICD-10-CM

## 2024-03-06 PROCEDURE — 99024 POSTOP FOLLOW-UP VISIT: CPT | Performed by: OBSTETRICS & GYNECOLOGY

## 2024-03-06 NOTE — PROGRESS NOTES
Michelle Prasad is a 33 y.o. female presents for postop care 4 WEEKs following DAVINCI DIAGNOSTIC LAPAROSCOPY WITH RIGHT OVARIAN CYSTECTOMY AND PARTIAL RIGHT OOPHORECTOMY .   Appetite is good. Eating a regular diet without difficulty.   Bowel movements are regular.  The patient is voiding without difficulty.  The patient is not having any pain..    Objective:     /80   Ht 1.651 m (5' 5\")   Wt 79.9 kg (176 lb 1 oz)   BMI 29.30 kg/m²     General:  alert, appears stated age, and cooperative   Abdomen: soft, bowel sounds active, non-tender   Incision:   healing well, no drainage, no erythema, no hernia, no seroma, no swelling, no dehiscence, incision well approximated     Assessment:     Doing well postoperatively.  FINAL PATHOLOGIC DIAGNOSIS     Ovary, right, cystectomy:        Benign, hemorrhagic physiologic cyst.        Multiple benign follicle cysts.     Plan:     1. Continue any current medications.  2. Wound care discussed.  3. Pt is to increase activities as tolerated..

## 2024-03-18 ENCOUNTER — TELEPHONE (OUTPATIENT)
Age: 34
End: 2024-03-18

## 2024-03-25 ENCOUNTER — TELEPHONE (OUTPATIENT)
Age: 34
End: 2024-03-25

## 2024-03-25 NOTE — TELEPHONE ENCOUNTER
Pt called to cancel upcoming appt. Pt stated she has an appt scheduled same day, will call back to reschedule.

## 2024-04-03 ENCOUNTER — OFFICE VISIT (OUTPATIENT)
Age: 34
End: 2024-04-03
Payer: MEDICAID

## 2024-04-03 DIAGNOSIS — N89.8 VAGINAL ODOR: Primary | ICD-10-CM
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
## 2024-04-03 DIAGNOSIS — T19.2XXA RETAINED TAMPON, INITIAL ENCOUNTER: ICD-10-CM

## 2024-04-03 PROCEDURE — 99213 OFFICE O/P EST LOW 20 MIN: CPT | Performed by: OBSTETRICS & GYNECOLOGY

## 2024-04-03 NOTE — PROGRESS NOTES
Michelle Prasad is a 33 y.o. female who presents today for the following:  Chief Complaint   Patient presents with    Vaginal Odor     Pt presents with complaints of vaginal odor during intercourse and during her cycle since surgery //Guilleley         No Known Allergies    Current Outpatient Medications   Medication Sig Dispense Refill    ibuprofen (ADVIL;MOTRIN) 600 MG tablet Take 1 tablet by mouth 3 times daily as needed for Pain 30 tablet 0     No current facility-administered medications for this visit.       Past Medical History:   Diagnosis Date    Abnormal Pap smear of cervix     Autoimmune disorder (HCC)     Lung nodule     Ovarian cyst     right    Rheumatoid arteritis (HCC)     Spleen enlarged     Trauma        Past Surgical History:   Procedure Laterality Date     SECTION      COLPOSCOPY      LAPAROSCOPY Right 2024    DAVINCI DIAGNOSTIC LAPAROSCOPY WITH RIGHT OVARIAN CYSTECTOMY performed by Jose De Jesus Schwartz MD at [facility] MAIN OR    [facility]      OTHER SURGICAL HISTORY      LEAP       Family History   Problem Relation Age of Onset    Lung Disease Mother         interstitial lung disease - ? crystallization of her lugns    Rheum Arthritis Mother     Osteoarthritis Mother     Cancer Mother         Skin    Heart Disease Father     Hypertension Father     Alcohol Abuse Maternal Grandfather     Pulmonary Fibrosis Maternal Grandmother     Cancer Maternal Aunt         Skin       Social History     Socioeconomic History    Marital status:      Spouse name: Not on file    Number of children: Not on file    Years of education: Not on file    Highest education level: Not on file   Occupational History    Not on file   Tobacco Use    Smoking status: Never    Smokeless tobacco: Never   Vaping Use    Vaping Use: Never used   Substance and Sexual Activity    Alcohol use: Not Currently     Comment: Occ.    Drug use: Never    Sexual activity: Yes     Partners: Male     Birth control/protection: None   Other

## 2024-07-22 ENCOUNTER — OFFICE VISIT (OUTPATIENT)
Age: 34
End: 2024-07-22
Payer: MEDICAID

## 2024-07-22 VITALS
WEIGHT: 178.25 LBS | BODY MASS INDEX: 29.7 KG/M2 | DIASTOLIC BLOOD PRESSURE: 79 MMHG | SYSTOLIC BLOOD PRESSURE: 133 MMHG | HEIGHT: 65 IN

## 2024-07-22 DIAGNOSIS — R87.612 LGSIL ON PAP SMEAR OF CERVIX: Primary | ICD-10-CM

## 2024-07-22 PROCEDURE — 99213 OFFICE O/P EST LOW 20 MIN: CPT | Performed by: OBSTETRICS & GYNECOLOGY

## 2024-07-22 NOTE — PROGRESS NOTES
Michelle Prasad is a 34 y.o. female who presents today for the following:  Chief Complaint   Patient presents with    Abnormal Pap Smear     Pt presents for 6 month repeat pap //MKeeley         No Known Allergies    Current Outpatient Medications   Medication Sig Dispense Refill    ibuprofen (ADVIL;MOTRIN) 600 MG tablet Take 1 tablet by mouth 3 times daily as needed for Pain 30 tablet 0     No current facility-administered medications for this visit.       Past Medical History:   Diagnosis Date    Abnormal Pap smear of cervix     Autoimmune disorder (HCC)     Lung nodule     Ovarian cyst     right    Rheumatoid arteritis (HCC)     Spleen enlarged     Trauma        Past Surgical History:   Procedure Laterality Date     SECTION      COLPOSCOPY      LAPAROSCOPY Right 2024    DAVINCI DIAGNOSTIC LAPAROSCOPY WITH RIGHT OVARIAN CYSTECTOMY performed by Jose De Jesus Schwartz MD at [facility] MAIN OR    [facility]      OTHER SURGICAL HISTORY      LEAP       Family History   Problem Relation Age of Onset    Lung Disease Mother         interstitial lung disease - ? crystallization of her lugns    Rheum Arthritis Mother     Osteoarthritis Mother     Cancer Mother         Skin    Heart Disease Father     Hypertension Father     Alcohol Abuse Maternal Grandfather     Pulmonary Fibrosis Maternal Grandmother     Cancer Maternal Aunt         Skin       Social History     Socioeconomic History    Marital status:      Spouse name: Not on file    Number of children: Not on file    Years of education: Not on file    Highest education level: Not on file   Occupational History    Not on file   Tobacco Use    Smoking status: Never    Smokeless tobacco: Never   Vaping Use    Vaping Use: Never used   Substance and Sexual Activity    Alcohol use: Not Currently     Comment: Occ.    Drug use: Never    Sexual activity: Yes     Partners: Male     Birth control/protection: None   Other Topics Concern    Not on file   Social History Narrative

## 2024-07-30 LAB
., LABCORP: ABNORMAL
C TRACH RRNA CVX QL NAA+PROBE: NEGATIVE
CYTOLOGIST CVX/VAG CYTO: ABNORMAL
CYTOLOGY CVX/VAG DOC CYTO: ABNORMAL
CYTOLOGY CVX/VAG DOC THIN PREP: ABNORMAL
DX ICD CODE: ABNORMAL
DX ICD CODE: ABNORMAL
Lab: ABNORMAL
N GONORRHOEA RRNA CVX QL NAA+PROBE: NEGATIVE
OTHER STN SPEC: ABNORMAL
PATHOLOGIST CVX/VAG CYTO: ABNORMAL
STAT OF ADQ CVX/VAG CYTO-IMP: ABNORMAL
T VAGINALIS RRNA SPEC QL NAA+PROBE: NEGATIVE

## 2024-08-06 ENCOUNTER — TELEPHONE (OUTPATIENT)
Age: 34
End: 2024-08-06

## 2024-08-06 NOTE — TELEPHONE ENCOUNTER
Called pt on 8/6/24 to  her 9/9/24 appt at the  [facility] due to provider will be at the [facility] that day. No answer, lvm asking pt to return call.

## 2024-09-09 ENCOUNTER — PROCEDURE VISIT (OUTPATIENT)
Age: 34
End: 2024-09-09
Payer: MEDICAID

## 2024-09-09 VITALS
SYSTOLIC BLOOD PRESSURE: 124 MMHG | WEIGHT: 171.25 LBS | DIASTOLIC BLOOD PRESSURE: 70 MMHG | HEIGHT: 65 IN | BODY MASS INDEX: 28.53 KG/M2

## 2024-09-09 DIAGNOSIS — R87.612 LGSIL OF CERVIX OF UNDETERMINED SIGNIFICANCE: Primary | ICD-10-CM

## 2024-09-09 PROCEDURE — 57454 BX/CURETT OF CERVIX W/SCOPE: CPT | Performed by: OBSTETRICS & GYNECOLOGY

## 2024-09-12 LAB
CPT BILLING CODE: ABNORMAL
DIAGNOSIS SYNOPSIS:: ABNORMAL
DX ICD CODE: ABNORMAL
DX ICD CODE: ABNORMAL
PATH REPORT.FINAL DX SPEC: ABNORMAL
PATH REPORT.GROSS SPEC: ABNORMAL
PATH REPORT.RELEVANT HX SPEC: ABNORMAL
PATH REPORT.SITE OF ORIGIN SPEC: ABNORMAL
PATHOLOGIST NAME: ABNORMAL
PAYMENT PROCEDURE: ABNORMAL

## 2025-09-02 ENCOUNTER — OFFICE VISIT (OUTPATIENT)
Age: 35
End: 2025-09-02
Payer: MEDICAID

## 2025-09-02 VITALS
WEIGHT: 156.9 LBS | HEART RATE: 68 BPM | DIASTOLIC BLOOD PRESSURE: 82 MMHG | SYSTOLIC BLOOD PRESSURE: 133 MMHG | BODY MASS INDEX: 26.11 KG/M2

## 2025-09-02 DIAGNOSIS — Z76.89 ENCOUNTER TO ESTABLISH CARE: ICD-10-CM

## 2025-09-02 DIAGNOSIS — N92.6 IRREGULAR MENSTRUAL CYCLE: Primary | ICD-10-CM

## 2025-09-02 PROCEDURE — 99214 OFFICE O/P EST MOD 30 MIN: CPT | Performed by: NURSE PRACTITIONER

## 2025-09-02 RX ORDER — MEDROXYPROGESTERONE ACETATE 10 MG
10 TABLET ORAL DAILY
Qty: 10 TABLET | Refills: 0 | Status: SHIPPED | OUTPATIENT
Start: 2025-09-02

## (undated) DEVICE — GLOVE ORANGE PI 7 1/2   MSG9075

## (undated) DEVICE — SOUTHSIDE TURNOVER: Brand: MEDLINE INDUSTRIES, INC.

## (undated) DEVICE — SYRINGE MED 30ML STD CLR PLAS LUERLOCK TIP N CTRL DISP

## (undated) DEVICE — PAD MATERNITY L11IN FLOW NONADHESIVE UNSCENTED TAIL LOCHIA

## (undated) DEVICE — LAPAROSCOPIC CHOLE PACK: Brand: MEDLINE INDUSTRIES, INC.

## (undated) DEVICE — DRAPE,TOP,102X53,STERILE: Brand: MEDLINE

## (undated) DEVICE — BLADE,CARBON-STEEL,11,STRL,DISPOSABLE,TB: Brand: MEDLINE

## (undated) DEVICE — SOLUTION IV 1000ML 0.9% SOD CHL PH 5 INJ USP VIAFLX PLAS

## (undated) DEVICE — Device

## (undated) DEVICE — PUMP SUC IRR TBNG L10FT W/ HNDPC ASSEMB STRYKEFLOW 2

## (undated) DEVICE — SUTURE SZ 0 27IN 5/8 CIR UR-6  TAPER PT VIOLET ABSRB VICRYL J603H

## (undated) DEVICE — VESSEL SEALER EXTEND: Brand: ENDOWRIST

## (undated) DEVICE — COLUMN DRAPE

## (undated) DEVICE — PAD POSITIONING WNG STD KIT W/BODY STRP LF DISP

## (undated) DEVICE — TUBING INSUFFLATOR HEAT HUMIDIFIED SMK EVAC SET PNEUMOCLEAR

## (undated) DEVICE — DBD-PACK,LAVH,STERILE: Brand: MEDLINE

## (undated) DEVICE — DRAPE CLAMP,DISPOSABLE: Brand: DEVON

## (undated) DEVICE — GLOVE ORANGE PI 8   MSG9080

## (undated) DEVICE — INSUFFLATION NEEDLE TO ESTABLISH PNEUMOPERITONEUM.: Brand: INSUFFLATION NEEDLE

## (undated) DEVICE — SUTURE MCRYL + SZ 4-0 L27IN ABSRB UD L19MM PS-2 3/8 CIR MCP426H

## (undated) DEVICE — HYPODERMIC SAFETY NEEDLE: Brand: MONOJECT

## (undated) DEVICE — PREP PAD BNS: Brand: CONVERTORS

## (undated) DEVICE — COVER,MAYO STAND,STERILE: Brand: MEDLINE

## (undated) DEVICE — SOLUTION IRRIG 500ML 0.9% SOD CHLO USP POUR PLAS BTL

## (undated) DEVICE — CANNULA SEAL

## (undated) DEVICE — VAGINAL PREP TRAY: Brand: MEDLINE INDUSTRIES, INC.

## (undated) DEVICE — GARMENT,MEDLINE,DVT,INT,CALF,MED, GEN2: Brand: MEDLINE

## (undated) DEVICE — SURGICEL ENDOSCP APPL

## (undated) DEVICE — APPLICATOR MEDICATED 26 CC SOLUTION HI LT ORNG CHLORAPREP

## (undated) DEVICE — AGENT HEMSTAT 3GM OXIDIZED REGENERATED CELOS ABSRB FOR CONT (ORDER MULTIPLES OF 5EA)

## (undated) DEVICE — ARM DRAPE

## (undated) DEVICE — SYRINGE MED 10ML LUERLOCK TIP W/O SFTY DISP

## (undated) DEVICE — SPONGE LAPAROTOMY W18XL18IN WHITE STRUNG RADIOPAQUE STERILE

## (undated) DEVICE — 1LYRTR 16FR10ML100%SIL UMS SNP: Brand: MEDLINE INDUSTRIES, INC.

## (undated) DEVICE — LIQUIBAND RAPID ADHESIVE 36/CS 0.8ML: Brand: MEDLINE

## (undated) DEVICE — BLADELESS OBTURATOR: Brand: WECK VISTA

## (undated) DEVICE — GOWN SURG XL 56.5 IN AAMI LEVEL 3 ORBIS